# Patient Record
Sex: FEMALE | Race: WHITE
[De-identification: names, ages, dates, MRNs, and addresses within clinical notes are randomized per-mention and may not be internally consistent; named-entity substitution may affect disease eponyms.]

---

## 2019-04-18 ENCOUNTER — HOSPITAL ENCOUNTER (INPATIENT)
Dept: HOSPITAL 74 - JER | Age: 74
LOS: 11 days | Discharge: HOME | DRG: 177 | End: 2019-04-29
Payer: COMMERCIAL

## 2019-04-18 VITALS — BODY MASS INDEX: 20 KG/M2

## 2019-04-18 DIAGNOSIS — L89.154: ICD-10-CM

## 2019-04-18 DIAGNOSIS — I50.9: ICD-10-CM

## 2019-04-18 DIAGNOSIS — R09.02: ICD-10-CM

## 2019-04-18 DIAGNOSIS — G93.41: ICD-10-CM

## 2019-04-18 DIAGNOSIS — G20: ICD-10-CM

## 2019-04-18 DIAGNOSIS — J98.11: ICD-10-CM

## 2019-04-18 DIAGNOSIS — Z74.01: ICD-10-CM

## 2019-04-18 DIAGNOSIS — J69.0: Primary | ICD-10-CM

## 2019-04-18 DIAGNOSIS — R62.7: ICD-10-CM

## 2019-04-18 DIAGNOSIS — N39.0: ICD-10-CM

## 2019-04-18 DIAGNOSIS — R06.02: ICD-10-CM

## 2019-04-18 DIAGNOSIS — Z96.643: ICD-10-CM

## 2019-04-18 DIAGNOSIS — F02.80: ICD-10-CM

## 2019-04-18 LAB
ALBUMIN SERPL-MCNC: 3 G/DL (ref 3.4–5)
ALP SERPL-CCNC: 97 U/L (ref 45–117)
ALT SERPL-CCNC: 6 U/L (ref 13–61)
ANION GAP SERPL CALC-SCNC: 7 MMOL/L (ref 8–16)
APPEARANCE UR: (no result)
APTT BLD: 24.1 SECONDS (ref 25.2–36.5)
AST SERPL-CCNC: 25 U/L (ref 15–37)
BACTERIA #/AREA URNS HPF: 8239.4 /HPF
BASOPHILS # BLD: 0.4 % (ref 0–2)
BILIRUB SERPL-MCNC: 0.4 MG/DL (ref 0.2–1)
BILIRUB UR STRIP.AUTO-MCNC: NEGATIVE MG/DL
BNP SERPL-MCNC: 1036.6 PG/ML (ref 5–450)
BUN SERPL-MCNC: 16 MG/DL (ref 7–18)
CALCIUM SERPL-MCNC: 9 MG/DL (ref 8.5–10.1)
CASTS #/AREA URNS LPF: 64 /LPF (ref 0–8)
CHLORIDE SERPL-SCNC: 103 MMOL/L (ref 98–107)
CO2 SERPL-SCNC: 27 MMOL/L (ref 21–32)
COLOR UR: YELLOW
CREAT SERPL-MCNC: 0.7 MG/DL (ref 0.55–1.3)
DEPRECATED RDW RBC AUTO: 13.5 % (ref 11.6–15.6)
EOSINOPHIL # BLD: 0.4 % (ref 0–4.5)
EPITH CASTS URNS QL MICRO: 2.4 /HPF
GLUCOSE SERPL-MCNC: 94 MG/DL (ref 74–106)
HCT VFR BLD CALC: 35 % (ref 32.4–45.2)
HGB BLD-MCNC: 11.5 GM/DL (ref 10.7–15.3)
INR BLD: 1.28 (ref 0.83–1.09)
KETONES UR QL STRIP: (no result)
LEUKOCYTE ESTERASE UR QL STRIP.AUTO: (no result)
LYMPHOCYTES # BLD: 2.3 % (ref 8–40)
MCH RBC QN AUTO: 30.6 PG (ref 25.7–33.7)
MCHC RBC AUTO-ENTMCNC: 32.8 G/DL (ref 32–36)
MCV RBC: 93.5 FL (ref 80–96)
MONOCYTES # BLD AUTO: 8.7 % (ref 3.8–10.2)
NEUTROPHILS # BLD: 88.2 % (ref 42.8–82.8)
NITRITE UR QL STRIP: POSITIVE
PH UR: 6.5 [PH] (ref 5–8)
PLATELET # BLD AUTO: 371 K/MM3 (ref 134–434)
PMV BLD: 6.7 FL (ref 7.5–11.1)
POTASSIUM SERPLBLD-SCNC: 4.8 MMOL/L (ref 3.5–5.1)
PROT SERPL-MCNC: 7.4 G/DL (ref 6.4–8.2)
PROT UR QL STRIP: (no result)
PROT UR QL STRIP: NEGATIVE
PT PNL PPP: 15.2 SEC (ref 9.7–13)
RBC # BLD AUTO: 3.75 M/MM3 (ref 3.6–5.2)
RBC # BLD AUTO: 8.1 /HPF (ref 0–4)
SODIUM SERPL-SCNC: 137 MMOL/L (ref 136–145)
SP GR UR: 1.02 (ref 1.01–1.03)
UROBILINOGEN UR STRIP-MCNC: 1 MG/DL (ref 0.2–1)
WBC # BLD AUTO: 8.1 K/MM3 (ref 4–10)
WBC # UR AUTO: 428 /HPF (ref 0–5)

## 2019-04-18 PROCEDURE — G0378 HOSPITAL OBSERVATION PER HR: HCPCS

## 2019-04-18 RX ADMIN — QUETIAPINE FUMARATE SCH MG: 25 TABLET ORAL at 23:09

## 2019-04-18 RX ADMIN — METOPROLOL TARTRATE SCH MG: 25 TABLET, FILM COATED ORAL at 23:09

## 2019-04-18 RX ADMIN — HEPARIN SODIUM SCH UNIT: 5000 INJECTION, SOLUTION INTRAVENOUS; SUBCUTANEOUS at 23:09

## 2019-04-18 NOTE — PDOC
History of Present Illness





- General


Stated Complaint: WEEZING


Time Seen by Provider: 04/18/19 14:24


History Source: Patient


Exam Limitations: No Limitations





- History of Present Illness


Initial Comments: 





04/18/19 14:26


74 year old woman history decubitus sacral ulcer, advanced Parkinson's disease (

bedbound) presents with cough and wheeze for 2 days. The patient had a cough 

noted last night by  who called PCP Shaniqua who prescribed Levaquin for 

concern for PNA. Today the patient was having her sacral wound redressed by 

nursing aid who noted that the patient was wheezing and called EMS. 


At bedside patient reports feeling congested and shortness of breath when lying 

back. The patient has not had any fever, denies production of phlegm in cough, 

denies chest pain, denies nausea, vomiting, diarrhea, constipation, dysuria, 

hematuria.





Patient has had wound vac for sacral ulcer for some time and had it removed 

yesterday in wound clinic at this hospital, but the wound vac has not been 

replaced. 





04/18/19 15:11








Past History





- Past Medical History


Allergies/Adverse Reactions: 


 Allergies











Allergy/AdvReac Type Severity Reaction Status Date / Time


 


codeine Allergy Intermediate Itching Verified 04/18/19 14:51


 


Penicillins Allergy Intermediate Rash Verified 04/18/19 14:51











Home Medications: 


Ambulatory Orders





Carbidopa/Levodopa [Rytary ER 36.25 mg-145 mg Cap] 1 each PO QID 05/16/16 


Rotigotine [Neupro] 1 each TD DAILY 05/16/16 


Sennosides/Docusate Sodium [Stool Softener-Laxative Tab] 1 each PO DAILY 05/16/ 16 


Quetiapine Fumarate [Seroquel -] 50 mg PO HS #0 tablet 05/17/16 


Cefuroxime Axetil [Ceftin -] 500 mg PO BID 04/03/19 








Anemia: No


Asthma: No


Cancer: No


Cardiac Disorders: No


CVA: No


COPD: No


CHF: No


Dementia: Yes


Diabetes: No


GI Disorders: No


 Disorders: No


HTN: No


Hypercholesterolemia: No


Liver Disease: No


Seizures: No


Thyroid Disease: No





- Surgical History


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


Appendectomy: No


Cardiac Surgery: No


Cholecystectomy: No


Lung Surgery: No


Neurologic Surgery: No


Orthopedic Surgery: Yes ([facility]ley hip replacement 2003, broken jaw , brown arm)





- Immunization History


Immunization Up to Date: No





- Suicide/Smoking/Psychosocial Hx


Smoking History: Never smoked


Number of Cigarettes Smoked Daily: 0


Hx Alcohol Use: No


Drug/Substance Use Hx: No


Substance Use Type: None





**Review of Systems





- Review of Systems


Able to Perform ROS?: Yes


Comments:: 





04/18/19 14:48


GENERAL/CONSTITUTIONAL: No fever or chills. No weakness.


HEAD, EYES, EARS, NOSE AND THROAT: No change in vision. No ear pain or 

discharge. No sore throat.


CARDIOVASCULAR: No chest pain or shortness of breath


RESPIRATORY: No hemoptysis. + cough, wheeze


GASTROINTESTINAL: No nausea, vomiting, diarrhea or constipation.


GENITOURINARY: No dysuria, frequency, or change in urination.


MUSCULOSKELETAL: No joint or muscle swelling or pain. No neck or back pain.


SKIN: No rash


NEUROLOGIC: No headache, vertigo, loss of consciousness, or change in strength/

sensation.


ENDOCRINE: No increased thirst. No abnormal weight change


HEMATOLOGIC/LYMPHATIC: No anemia, easy bleeding, or history of blood clots.


ALLERGIC/IMMUNOLOGIC: No hives or skin allergy.








Is the patient limited English proficient: No





*Physical Exam





- Physical Exam


Comments: 





04/18/19 15:32








CTAB


stage 4 sacral wound 





ED Treatment Course





- LABORATORY


CBC & Chemistry Diagram: 


 04/18/19 15:27





 04/18/19 15:27





Medical Decision Making





- Medical Decision Making





04/18/19 14:45


74 year old woman history decubitus sacral ulcer, advanced Parkinson's disease 

presents with cough and wheeze for 2 days. The patient had a cough noted last 

night by  who called PCP Shaniqua who prescribed Levaquin for concern for 

PNA. Today the patient was having her sacral wound redressed by nursing aid who 

noted that the patient was wheezing and called EMS. 





ED Course:


consider pna vs chf vs 


r./o acs vs arrythmia 


r/o PE





04/18/19 15:07


At bedside patient bp 124/66, satting ~95 on RA





04/18/19 17:19


labs within normal 


CXR: cardiomegaly, coarse central changes, nodular density in the L apex, 

atelectasis at the L base 


Patient down to 92 O2


4L NC placed 





04/18/19 18:08


UA w/ uti 


dose bactrim 


pending CTA to r/o PE








*DC/Admit/Observation/Transfer





- Referrals


Referrals: 


Joel Henry MD [Primary Care Provider] - 





- Patient Instructions





- Post Discharge Activity

## 2019-04-18 NOTE — PDOC
*Physical Exam





- Vital Signs


 Last Vital Signs











Temp Pulse Resp BP Pulse Ox


 


 99.3 F   63   18   120/64   98 


 


 19 14:05  19 17:33  19 17:33  19 17:33  19 17:33














- Physical Exam


Comments: 





19 19:51


GENERAL: Awake, alert, and fully oriented, in no acute distress


HEAD: No signs of trauma, normocephalic, atraumatic 


EYES: PERRLA, EOMI, sclera anicteric, conjunctiva clear


ENT: Auricles normal inspection, hearing grossly normal, nares patent, 

oropharynx clear without


exudates. Moist mucosa


NECK: Normal ROM, supple, no lymphadenopathy, JVD, or masses


LUNGS:Coarse lung sounds throughout. Diffuse rales and Diffuse exp rhonci. 

speaks full sentences. 


HEART: Regular rate and rhythm, normal S1 and S2, no murmurs, rubs or gallops, 

peripheral pulses normal and equal bilaterally. 


ABDOMEN: Soft, nontender, normoactive bowel sounds.  No guarding, no rebound.  

No masses


BACK: + Sacral ulcer stage 4 


EXTREMITIES : Normal inspection, Normal range of motion, no edema.  No clubbing 

or cyanosis. 


NEUROLOGICAL: Cranial nerves II through XII grossly intact.  Normal speech, 

normal gait, no focal sensorimotor deficits 


SKIN: Warm, Dry, normal turgor, no rashes or lesions noted








ED Treatment Course





- LABORATORY


CBC & Chemistry Diagram: 


 19 15:27





 19 15:27





- ADDITIONAL ORDERS


Additional order review: 


 Laboratory  Results











  19





  16:10 16:10 15:27


 


PT with INR   


 


INR   


 


PTT (Actin FS)   


 


D-Dimer  2180 H  


 


VBG pH   


 


POC VBG pCO2   


 


POC VBG pO2   


 


VBG HCO3   


 


VBG O2 Sat ([place])   


 


VBG Base Excess   


 


Sodium    137


 


Potassium    4.8


 


Chloride    103


 


Carbon Dioxide    27


 


Anion Gap    7 L


 


BUN    16


 


Creatinine    0.7


 


Creat Clearance w eGFR    81.36


 


Random Glucose    94


 


Lactic Acid   


 


Calcium    9.0


 


Total Bilirubin    0.4


 


AST    25


 


ALT    6 L


 


Alkaline Phosphatase    97


 


Troponin I    < 0.02


 


B-Natriuretic Peptide    1036.6 H


 


Total Protein    7.4


 


Albumin    3.0 L


 


Urine Color   Yellow 


 


Urine Appearance   Turbid 


 


Urine pH   6.5 


 


Ur Specific Gravity   1.020 


 


Urine Protein   Trace 


 


Urine Glucose (UA)   Negative 


 


Urine Ketones   Trace H 


 


Urine Blood   Negative 


 


Urine Nitrite   Positive H 


 


Urine Bilirubin   Negative 


 


Urine Urobilinogen   1.0 


 


Ur Leukocyte Esterase   3+ H 


 


Urine WBC (Auto)   428 


 


Urine RBC (Auto)   8.1 


 


Urine Casts (Auto)   64 


 


U Epithel Cells (Auto)   2.4 


 


Urine Bacteria (Auto)   8239.4 














  19





  15:20 15:20 15:20


 


PT with INR    15.20 H


 


INR    1.28 H


 


PTT (Actin FS)    24.1 L


 


D-Dimer   


 


VBG pH   Cancelled 


 


POC VBG pCO2   Cancelled 


 


POC VBG pO2   Cancelled 


 


VBG HCO3   Cancelled 


 


VBG O2 Sat ([place])   Cancelled 


 


VBG Base Excess   Cancelled 


 


Sodium   


 


Potassium   


 


Chloride   


 


Carbon Dioxide   


 


Anion Gap   


 


BUN   


 


Creatinine   


 


Creat Clearance w eGFR   


 


Random Glucose   


 


Lactic Acid  1.1  


 


Calcium   


 


Total Bilirubin   


 


AST   


 


ALT   


 


Alkaline Phosphatase   


 


Troponin I   


 


B-Natriuretic Peptide   


 


Total Protein   


 


Albumin   


 


Urine Color   


 


Urine Appearance   


 


Urine pH   


 


Ur Specific Gravity   


 


Urine Protein   


 


Urine Glucose (UA)   


 


Urine Ketones   


 


Urine Blood   


 


Urine Nitrite   


 


Urine Bilirubin   


 


Urine Urobilinogen   


 


Ur Leukocyte Esterase   


 


Urine WBC (Auto)   


 


Urine RBC (Auto)   


 


Urine Casts (Auto)   


 


U Epithel Cells (Auto)   


 


Urine Bacteria (Auto)   








 











  19





  15:27


 


RBC  3.75


 


MCV  93.5


 


MCHC  32.8


 


RDW  13.5


 


MPV  6.7 L


 


Neutrophils %  88.2 H


 


Lymphocytes %  2.3 L D


 


Monocytes %  8.7


 


Eosinophils %  0.4


 


Basophils %  0.4














- RADIOLOGY


Radiology Studies Ordered: 





19 20:07


[facility] Name: [facility] DEPARTMENT OF RADIOLOGY Phys: Kareem Rose MD 

: 1945 Age: 74 Sex: F [facility] Acct: [id number] 

Loc: [address] Exam Date: 19 Status: [facility] [place] [address] 

Unit Number: [id number] ACCESSION #: [id number] EXAM#: TYPE/EXAM

: RESULT: [id number] CT/CHEST CTA Chest CT angiography Clinical information given

: evaluate for pulmonary embolism Multiplanar imaging was performed following 

the intravenous bolus administration of nonionic contrast. No prior CT studies 

are available at this facility for direct comparison. No definite pulmonary 

embolus is noted. Evaluation of the bilateral lower lobe subsegmental vessels 

is limited due to respiratory motion artifact. An approximately 10 x 2.3 x 0.5 

cm mildly hyperdense focus is seen abutting the left posterolateral border of 

the descending aorta suggestive of an intramural hematoma. No intimal flap or 

aortic lumen compromise is seen. No aortic aneurysm is seen There is no 

definite cardiac enlargement. A small pericardial effusion is noted. No 

evidence of pneumothorax, infiltrate or pleural effusion. There is mild 

bilateral lower lobe discoid atelectasis with mild subpleural atelectasis 

posteriorly. The visualized osseous structures demonstrate no gross acute 

pathology. No obvious endobronchial pathology is visualized. Impression: No 

definite CT evidence of pulmonary embolism. Evaluation of the lower lobe 

subsegmental vessels bilaterally is limited due to motion artifact. If 

clinically indicated correlate with bilateral lower extremity venous 

sonography. An approximately 10 x 2.3 x 0.5 cm mildly hyperdense crescentic 

focus is seen abutting the left posterior lateral border of the descending 

aorta suggestive of an intramural hematoma. Mild bilateral lower lobe discoid 

atelectasis. Mild bibasilar subpleural atelectasis posteriorly. Reported By: 

Randall De Dios MD 19 Kareem Rose Technologist: Millicent Caldwell 

Transcribed Date/Time: 19 : Randall De Dios Printed 

Date/Time: By: 





- Medications


Given in the ED: 


ED Medications














Discontinued Medications














Generic Name Dose Route Start Last Admin





  Trade Name Freq  PRN Reason Stop Dose Admin


 


Albuterol/Ipratropium  1 amp  19 15:29  19 15:35





  Duoneb -  NEB  19 15:30  1 amp





  ONCE ONE   Administration





     





     





     





     














Medical Decision Making





- Medical Decision Making





19 19:44


75 yo F with h/o decubitus sacral ulcer, Wound vac for sacral ulcer for 

extended period of time and removed 19 in wound clinic at [facility], advanced 

Parkinson's disease (bedbound BIBEMS with cough and wheeze x 2 days. Received 

signout from Dr. Cabello. Patient on Levaquin for presumed PNA x 2 days. Noted 

to be hypoxic by EMS to 80% O2 RA. BP 94/53. Patient reports orthopnea. 


Physical exam coarse lung sounds throughout. Diffuse rales and Diffuse exp 

rhonci, received duonebs. ED course also notable for 92 % O2 4 L NC, vitals wnl

, AF, CXR with cardiomegaly and coarse central changes with nodular density in 

L apex. Pending CTA r/o PE. Patient with elevated BNP, hypoxic, SOB. Plan to 

admit CHF exacerbation. 





19 20:05


ED Course: 


 Laboratory Tests











  19





  15:20 15:27 15:27


 


WBC   8.1 


 


Hgb   11.5 


 


Hct   35.0 


 


Plt Count   371 


 


BUN    16


 


Creatinine    0.7


 


Lactic Acid  1.1  


 


Troponin I    < 0.02


 


B-Natriuretic Peptide    1036.6 H


 


Urine Color   


 


Urine Ketones   


 


Urine Nitrite   


 


Ur Leukocyte Esterase   


 


Urine WBC (Auto)   


 


Urine RBC (Auto)   


 


Urine Bacteria (Auto)   


 


Influenza A (Rapid)   


 


Influenza B (Rapid)   














  19





  16:10 16:10


 


WBC  


 


Hgb  


 


Hct  


 


Plt Count  


 


BUN  


 


Creatinine  


 


Lactic Acid  


 


Troponin I  


 


B-Natriuretic Peptide  


 


Urine Color  Yellow 


 


Urine Ketones  Trace H 


 


Urine Nitrite  Positive H 


 


Ur Leukocyte Esterase  3+ H 


 


Urine WBC (Auto)  428 


 


Urine RBC (Auto)  8.1 


 


Urine Bacteria (Auto)  8239.4 


 


Influenza A (Rapid)   Negative


 


Influenza B (Rapid)   Negative











19 20:08


CTA chest: Impression: No definite CT evidence of pulmonary embolism. 

Evaluation of the lower lobe subsegmental vessels bilaterally is limited due to 

motion artifact. If clinically indicated correlate with bilateral lower 

extremity venous sonography. An approximately 10 x 2.3 x 0.5 cm mildly 

hyperdense crescentic focus is seen abutting the left posterior lateral border 

of the descending aorta suggestive of an intramural hematoma. Mild bilateral 

lower lobe discoid atelectasis. Mild bibasilar subpleural atelectasis 

posteriorly


19 20:08





Patient refused bactrim


19 20:31


Patient endorsed to Dr. Kalee Boggs Methylpred 


Admit tele/obs








*DC/Admit/Observation/Transfer


Diagnosis at time of Disposition: 


 Hypoxia, SOB (shortness of breath)





CHF exacerbation


Qualifiers:


 Heart failure type: unspecified Qualified Code(s): I50.9 - Heart failure, 

unspecified








- Discharge Dispostion


Condition at time of disposition: Stable


Decision to Admit order: Yes





- Referrals





- Patient Instructions





- Post Discharge Activity

## 2019-04-18 NOTE — HP
CHIEF COMPLAINT:  shortness of breath 





PCP: Fortunato  





HISTORY OF PRESENT ILLNESS:


74 year old woman history decubitus sacral ulcer, advanced Parkinson's disease (

bedbound) presents with cough and wheeze for 2 days. The patient had a cough 

noted last night by  who called PCP Fortunato who prescribed Levaquin for 

concern for PNA, found to have wheezing by visiting nurse, sent in to hospital 

for evaluation. 








ER course was notable for:


(1) Chest CT 


(2) duonebs 


(3) prednisone 


 


Recent Travel: no 





PAST MEDICAL HISTORY: decubitus sacral ulcer, advanced Parkinson's disease (

bedbound)





PAST SURGICAL HISTORY: no 





Social History:


Smoking: former smoker 


Alcohol: no 


Drugs:  no 





Family History:


Allergies





codeine Allergy (Intermediate, Verified 04/18/19 14:51)


 Itching


Penicillins Allergy (Intermediate, Verified 04/18/19 14:51)


 Rash








HOME MEDICATIONS:


 Home Medications











 Medication  Instructions  Recorded


 


Carbidopa/Levodopa [Rytary ER 1 each PO QID 05/16/16





36.25 mg-145 mg Cap]  


 


Rotigotine [Neupro] 1 each TD DAILY 05/16/16


 


Sennosides/Docusate Sodium [Stool 1 each PO DAILY 05/16/16





Softener-Laxative Tab]  


 


Cefuroxime Axetil [Ceftin -] 500 mg PO BID 04/03/19


 


Quetiapine Fumarate [Seroquel -] 25 mg PO TID 04/18/19








REVIEW OF SYSTEMS


CONSTITUTIONAL: 


Absent:  fever, chills, diaphoresis, generalized weakness, malaise, loss of 

appetite, weight change


HEENT: 


Absent:  rhinorrhea, nasal congestion, throat pain, throat swelling, difficulty 

swallowing, mouth swelling, ear pain, eye pain, visual changes


CARDIOVASCULAR: 


Absent: chest pain, syncope, palpitations, irregular heart rate, lightheadedness

, peripheral edema


RESPIRATORY: 


Absent: cough, dyspnea with exertion, orthopnea, wheezing, stridor, hemoptysis


present -  shortness of breath,


GASTROINTESTINAL:


Absent: abdominal pain, abdominal distension, nausea, vomiting, diarrhea, 

constipation, melena, hematochezia


GENITOURINARY: 


Absent: dysuria, frequency, urgency, hesitancy, hematuria, flank pain, genital 

pain


MUSCULOSKELETAL: 


Absent: myalgia, arthralgia, joint swelling, back pain, neck pain


SKIN: 


Absent: rash, itching, pallor


HEMATOLOGIC/IMMUNOLOGIC: 


Absent: easy bleeding, easy bruising, lymphadenopathy, frequent infections


ENDOCRINE:


Absent: unexplained weight gain, unexplained weight loss, heat intolerance, 

cold intolerance


NEUROLOGIC: 


Absent: headache, focal weakness or paresthesias, dizziness, unsteady gait, 

seizure, mental status changes, bladder or bowel incontinence


PSYCHIATRIC: 


Absent: anxiety, depression, suicidal or homicidal ideation, hallucinations.








PHYSICAL EXAMINATION


 Vital Signs - 24 hr











  04/18/19 04/18/19 04/18/19





  14:05 15:06 15:31


 


Temperature 99.3 F  


 


Pulse Rate 90  


 


Pulse Rate [  87 76





Left Radial]   


 


Respiratory 20 20 20





Rate   


 


Blood Pressure 94/53 L  


 


Blood Pressure  124/66 108/69





[Right Arm]   


 


O2 Sat by Pulse 89 L 98 98





Oximetry (%)   














  04/18/19 04/18/19





  16:41 17:33


 


Temperature  


 


Pulse Rate  


 


Pulse Rate [ 63 63





Left Radial]  


 


Respiratory 20 18





Rate  


 


Blood Pressure  


 


Blood Pressure 121/61 120/64





[Right Arm]  


 


O2 Sat by Pulse 98 98





Oximetry (%)  











GENERAL: Awake, alert, disoriented 


HEAD: Normal with no signs of trauma.


EYES: Pupils equal, round and reactive to light, extraocular movements intact, 

sclera anicteric, conjunctiva clear. No lid lag.


EARS, NOSE, THROAT: Ears normal, nares patent, oropharynx clear without 

exudates. Moist mucous membranes.


NECK: Normal range of motion, supple without lymphadenopathy, JVD, or masses.


LUNGS: some coarse breaths sounds b/l, no wheezing appreciated 


HEART: Regular rate and rhythm, normal S1 and S2 without murmur, rub or gallop.


ABDOMEN: Soft, nontender, not distended, normoactive bowel sounds, no guarding, 

no rebound, no masses.  No hepatomegaly or  splenomegaly. 


MUSCULOSKELETAL: Normal range of motion at all joints. No bony deformities or 

tenderness. No CVA tenderness.


UPPER EXTREMITIES: 2+ pulses, warm, well-perfused. No cyanosis. No clubbing. No 

peripheral edema.


LOWER EXTREMITIES: 2+ pulses, warm, well-perfused. No calf tenderness. No 

peripheral edema. 


NEUROLOGICAL:  Cranial nerves II-XII intact. dementia, bed bound 


PSYCHIATRIC: dementia 


SKIN: Warm, dry, normal turgor, no rashes


stage 4 sacral decubitus ulcer  








 Laboratory Results - last 24 hr











  04/18/19 04/18/19 04/18/19





  15:20 15:20 15:20


 


WBC   


 


RBC   


 


Hgb   


 


Hct   


 


MCV   


 


MCH   


 


MCHC   


 


RDW   


 


Plt Count   


 


MPV   


 


Absolute Neuts (auto)   


 


Neutrophils %   


 


Lymphocytes %   


 


Monocytes %   


 


Eosinophils %   


 


Basophils %   


 


Nucleated RBC %   


 


PT with INR  15.20 H  


 


INR  1.28 H  


 


PTT (Actin FS)  24.1 L  


 


D-Dimer   


 


VBG pH   Cancelled 


 


POC VBG pCO2   Cancelled 


 


POC VBG pO2   Cancelled 


 


VBG HCO3   Cancelled 


 


VBG O2 Sat ([place])   Cancelled 


 


VBG Base Excess   Cancelled 


 


Sodium   


 


Potassium   


 


Chloride   


 


Carbon Dioxide   


 


Anion Gap   


 


BUN   


 


Creatinine   


 


Creat Clearance w eGFR   


 


Random Glucose   


 


Lactic Acid    1.1


 


Calcium   


 


Total Bilirubin   


 


AST   


 


ALT   


 


Alkaline Phosphatase   


 


Troponin I   


 


B-Natriuretic Peptide   


 


Total Protein   


 


Albumin   


 


Urine Color   


 


Urine Appearance   


 


Urine pH   


 


Ur Specific Gravity   


 


Urine Protein   


 


Urine Glucose (UA)   


 


Urine Ketones   


 


Urine Blood   


 


Urine Nitrite   


 


Urine Bilirubin   


 


Urine Urobilinogen   


 


Ur Leukocyte Esterase   


 


Urine WBC (Auto)   


 


Urine RBC (Auto)   


 


Urine Casts (Auto)   


 


U Epithel Cells (Auto)   


 


Urine Bacteria (Auto)   


 


Influenza A (Rapid)   


 


Influenza B (Rapid)   














  04/18/19 04/18/19 04/18/19





  15:27 15:27 16:10


 


WBC  8.1  


 


RBC  3.75  


 


Hgb  11.5  


 


Hct  35.0  


 


MCV  93.5  


 


MCH  30.6  


 


MCHC  32.8  


 


RDW  13.5  


 


Plt Count  371  


 


MPV  6.7 L  


 


Absolute Neuts (auto)  7.1  


 


Neutrophils %  88.2 H  


 


Lymphocytes %  2.3 L D  


 


Monocytes %  8.7  


 


Eosinophils %  0.4  


 


Basophils %  0.4  


 


Nucleated RBC %  0  


 


PT with INR   


 


INR   


 


PTT (Actin FS)   


 


D-Dimer   


 


VBG pH   


 


POC VBG pCO2   


 


POC VBG pO2   


 


VBG HCO3   


 


VBG O2 Sat ([place])   


 


VBG Base Excess   


 


Sodium   137 


 


Potassium   4.8 


 


Chloride   103 


 


Carbon Dioxide   27 


 


Anion Gap   7 L 


 


BUN   16 


 


Creatinine   0.7 


 


Creat Clearance w eGFR   81.36 


 


Random Glucose   94 


 


Lactic Acid   


 


Calcium   9.0 


 


Total Bilirubin   0.4 


 


AST   25 


 


ALT   6 L 


 


Alkaline Phosphatase   97 


 


Troponin I   < 0.02 


 


B-Natriuretic Peptide   1036.6 H 


 


Total Protein   7.4 


 


Albumin   3.0 L 


 


Urine Color    Yellow


 


Urine Appearance    Turbid


 


Urine pH    6.5


 


Ur Specific Gravity    1.020


 


Urine Protein    Trace


 


Urine Glucose (UA)    Negative


 


Urine Ketones    Trace H


 


Urine Blood    Negative


 


Urine Nitrite    Positive H


 


Urine Bilirubin    Negative


 


Urine Urobilinogen    1.0


 


Ur Leukocyte Esterase    3+ H


 


Urine WBC (Auto)    428


 


Urine RBC (Auto)    8.1


 


Urine Casts (Auto)    64


 


U Epithel Cells (Auto)    2.4


 


Urine Bacteria (Auto)    8239.4


 


Influenza A (Rapid)   


 


Influenza B (Rapid)   














  04/18/19 04/18/19





  16:10 16:10


 


WBC  


 


RBC  


 


Hgb  


 


Hct  


 


MCV  


 


MCH  


 


MCHC  


 


RDW  


 


Plt Count  


 


MPV  


 


Absolute Neuts (auto)  


 


Neutrophils %  


 


Lymphocytes %  


 


Monocytes %  


 


Eosinophils %  


 


Basophils %  


 


Nucleated RBC %  


 


PT with INR  


 


INR  


 


PTT (Actin FS)  


 


D-Dimer  2180 H 


 


VBG pH  


 


POC VBG pCO2  


 


POC VBG pO2  


 


VBG HCO3  


 


VBG O2 Sat ([place])  


 


VBG Base Excess  


 


Sodium  


 


Potassium  


 


Chloride  


 


Carbon Dioxide  


 


Anion Gap  


 


BUN  


 


Creatinine  


 


Creat Clearance w eGFR  


 


Random Glucose  


 


Lactic Acid  


 


Calcium  


 


Total Bilirubin  


 


AST  


 


ALT  


 


Alkaline Phosphatase  


 


Troponin I  


 


B-Natriuretic Peptide  


 


Total Protein  


 


Albumin  


 


Urine Color  


 


Urine Appearance  


 


Urine pH  


 


Ur Specific Gravity  


 


Urine Protein  


 


Urine Glucose (UA)  


 


Urine Ketones  


 


Urine Blood  


 


Urine Nitrite  


 


Urine Bilirubin  


 


Urine Urobilinogen  


 


Ur Leukocyte Esterase  


 


Urine WBC (Auto)  


 


Urine RBC (Auto)  


 


Urine Casts (Auto)  


 


U Epithel Cells (Auto)  


 


Urine Bacteria (Auto)  


 


Influenza A (Rapid)   Negative


 


Influenza B (Rapid)   Negative





imaging studies reviewed 





ASSESSMENT/PLAN:


#Shortness of breath - may be secondary to CHF exacerbation vs reactive airway 

disease. No lung infiltrates on chest CT,  no evidence of PE. Pulmonary 

vasculature appears congested, elevated BNP  c/w CHF exacerbation.  


-tele-obs 


-lasix 40mg IV daily 


-i/o 


-daily weights 


-fluid restriction 


-bblocker 


-acei 


-echo 


-cardiology evaluation 





#Parkinson disease


-c/w home dose carbidopa/levadopa 


-bed rest 





#Sacral decubitus ulcer 


-local wound care 


-consult - dr hair





#DVT ppx 


-heparin sc 











Visit type





- Emergency Visit


Emergency Visit: Yes


ED Registration Date: 04/18/19


Care time: The patient presented to the Emergency Department on the above date 

and was hospitalized for further evaluation of their emergent condition.





- New Patient


This patient is new to me today: Yes


Date on this admission: 04/18/19





- Critical Care


Critical Care patient: No

## 2019-04-18 NOTE — PDOC
Documentation entered by Tiara Handley SCRIBE, acting as scribe for Kareem Rose MD.








Kareem Rose MD:  This documentation has been prepared by the Emmanuelle wang Amanda, SCRIBE, under my direction and personally reviewed by me in its 

entirety.  I confirm that the documentation accurately reflects all work, 

treatment, procedures, and medical decision making performed by me.  





Attending Attestation





- Resident


Resident Name: Elisa Cabello





- ED Attending Attestation


I have performed the following: I have examined & evaluated the patient, The 

case was reviewed & discussed with the resident, I agree w/resident's findings 

& plan, Exceptions are as noted





- HPI


HPI: 





04/18/19 15:29


The patient is a 74 year old female with a significant medical history of 

decubitus sacral ulcer, advanced Parkinson's disease (bedbound) who presents to 

the ED with cough and wheezing for 2 days. The patient states she had a cough 

last night, called Dr. Henry who prescribed Levaquin for concern of possible 

pneumonia. The patient reports shortness of breath when lying flat. She states 

her cough is nonproductive. 





The patient denies chest pain, headache and dizziness. The patient denies fever

, chills, nausea, vomit, diarrhea and constipation. The patient denies dysuria, 

frequency, urgency and hematuria. 





- Physicial Exam


PE: 


04/18/19 15:31


GENERAL: Awake, alert, and fully oriented, in no acute distress


HEAD: No signs of trauma


EYES: PERRLA, EOMI, sclera anicteric, conjunctiva clear


ENT: Auricles normal inspection, hearing grossly normal, nares patent, 

oropharynx clear without exudates. Moist mucosa


NECK: Normal ROM, supple, no lymphadenopathy, JVD, or masses


LUNGS: Breath sounds equal, clear to auscultation bilaterally.  No wheezes, and 

no crackles


HEART: Regular rate and rhythm, normal S1 and S2, no murmurs, rubs or gallops


ABDOMEN: Soft, nontender, normoactive bowel sounds.  No guarding, no rebound.  

No masses


EXTREMITIES: Normal range of motion, no edema.  No clubbing or cyanosis. No 

cords, erythema, or tenderness


NEUROLOGICAL: Cranial nerves II-XII intact. Normal speech, normal gait. 

Sensation intact in upper and lower extremities. 5/5 motor strength in upper 

and lower extremities. No pronator drift. Finger to nose intact. Rapid 

alternations intact. 


SKIN: (+) stage 4 sacral wound c/d/i. Warm, Dry, normal turgor, no rashes or 

lesions noted.








- Medical Decision Making





04/18/19 15:39





Vital Signs











Temp Pulse Resp BP Pulse Ox


 


 99.3 F   90   20   94/53 L  89 L


 


 04/18/19 14:05  04/18/19 14:05  04/18/19 14:05  04/18/19 14:05  04/18/19 14:05








A portion of this note was documented by scribe services under my direction. I 

have reviewed the details of the note, within reason, and agree with the 

documentation with the following case summary and management plan written by 

me. 





Patient treated in the ED.





Nursing notes are reviewed and incorporated into the medical decision-making.


Vital signs reviewed.





Peripheral IV access obtained by the nurse, laboratory studies are drawn and 

sent, reviewed and interpreted by myself. 





74 year old female with Past medical history of bilateral hip replacement, 

Parkinson's disease and dementia, bedbound presents with cough and wheezing and 

difficulty breathing since yesterday. Denies sick contacts or recent travels. 

Denies fevers but noted that she was having chest congestion and wheezing. The 

patient  had called the primary care physician, Dr. Henry went 

prescribed levofloxacin which the patient has been taking for 2 days. However, 

the patient's symptoms were worsening. EMS was activated and the patient's 

oxygenation saturation was 89%. Over here, in the emergency department, the O2 

saturation is 92%. I am concerned for pneumonia. We'll child 2 nabs. Rule out 

influenza. Chest x-ray, sepsis workup. The patient retook her dose of Levaquin 

today. We'll need to admit the patient to the hospital.


04/18/19 15:59





 CBC, BMP





 04/18/19 15:27 





 CMP











Lactic Acid  1.1 mmol/L (0.4-2.0)   04/18/19  15:20    








Given pt is bedbound and with normal WBC, will await for chest xray.


If chest xray is unremarkable, given that patient is bedbound, we should 

evaluate with CTA chest to r/o PE.


Either way, pt should be admitted to the hospital.


04/18/19 17:11





 CMP











Sodium  137 mmol/L (136-145)   04/18/19  15:27    


 


Potassium  4.8 mmol/L (3.5-5.1)   04/18/19  15:27    


 


Chloride  103 mmol/L ()   04/18/19  15:27    


 


Carbon Dioxide  27 mmol/L (21-32)   04/18/19  15:27    


 


Anion Gap  7 MMOL/L (8-16)  L  04/18/19  15:27    


 


BUN  16 mg/dL (7-18)   04/18/19  15:27    


 


Creatinine  0.7 mg/dL (0.55-1.3)   04/18/19  15:27    


 


Creat Clearance w eGFR  81.36  (>60)   04/18/19  15:27    


 


Random Glucose  94 mg/dL ()   04/18/19  15:27    


 


Lactic Acid  1.1 mmol/L (0.4-2.0)   04/18/19  15:20    


 


Calcium  9.0 mg/dL (8.5-10.1)   04/18/19  15:27    


 


Total Bilirubin  0.4 mg/dL (0.2-1)   04/18/19  15:27    


 


AST  25 U/L (15-37)   04/18/19  15:27    


 


ALT  6 U/L (13-61)  L  04/18/19  15:27    


 


Alkaline Phosphatase  97 U/L ()   04/18/19  15:27    


 


Troponin I  < 0.02 ng/ml (0.00-0.05)   04/18/19  15:27    


 


B-Natriuretic Peptide  1036.6 pg/ml (5-450)  H  04/18/19  15:27    


 


Total Protein  7.4 g/dl (6.4-8.2)   04/18/19  15:27    


 


Albumin  3.0 g/dl (3.4-5.0)  L  04/18/19  15:27    








BNP elevated.


Chest xray reviewed by me, pending official radiology read. Cardiomegaly. ?Pulm 

vas congestion


Will obtain CTA to r/o PE, but pt should be admitted for CHF workup.


Will still need to consider infectious.





04/18/19 17:12


Pt signed out to oncoming ED attending Dr. Delgadillo for further management and 

disposition.





**Heart Score/ECG Review


  ** #1


ECG reviewed & interpreted by me at: 14:40





04/18/19 16:03


NSR 88, no std/summer, normal axis, normal intervals,  msec

## 2019-04-19 LAB
ANION GAP SERPL CALC-SCNC: 9 MMOL/L (ref 8–16)
BASOPHILS # BLD: 0.3 % (ref 0–2)
BUN SERPL-MCNC: 14 MG/DL (ref 7–18)
CALCIUM SERPL-MCNC: 9 MG/DL (ref 8.5–10.1)
CHLORIDE SERPL-SCNC: 102 MMOL/L (ref 98–107)
CO2 SERPL-SCNC: 27 MMOL/L (ref 21–32)
CREAT SERPL-MCNC: 0.6 MG/DL (ref 0.55–1.3)
DEPRECATED RDW RBC AUTO: 13.6 % (ref 11.6–15.6)
EOSINOPHIL # BLD: 0 % (ref 0–4.5)
GLUCOSE SERPL-MCNC: 133 MG/DL (ref 74–106)
HCT VFR BLD CALC: 34.3 % (ref 32.4–45.2)
HGB BLD-MCNC: 11.6 GM/DL (ref 10.7–15.3)
LYMPHOCYTES # BLD: 9.2 % (ref 8–40)
MCH RBC QN AUTO: 30.7 PG (ref 25.7–33.7)
MCHC RBC AUTO-ENTMCNC: 33.8 G/DL (ref 32–36)
MCV RBC: 90.9 FL (ref 80–96)
MONOCYTES # BLD AUTO: 3.3 % (ref 3.8–10.2)
NEUTROPHILS # BLD: 87.2 % (ref 42.8–82.8)
PLATELET # BLD AUTO: 352 K/MM3 (ref 134–434)
PMV BLD: 6.6 FL (ref 7.5–11.1)
POTASSIUM SERPLBLD-SCNC: 4.1 MMOL/L (ref 3.5–5.1)
RBC # BLD AUTO: 3.78 M/MM3 (ref 3.6–5.2)
SODIUM SERPL-SCNC: 139 MMOL/L (ref 136–145)
WBC # BLD AUTO: 2.7 K/MM3 (ref 4–10)

## 2019-04-19 RX ADMIN — Medication SCH EACH: at 21:56

## 2019-04-19 RX ADMIN — HEPARIN SODIUM SCH UNIT: 5000 INJECTION, SOLUTION INTRAVENOUS; SUBCUTANEOUS at 10:40

## 2019-04-19 RX ADMIN — Medication SCH EACH: at 18:03

## 2019-04-19 RX ADMIN — QUETIAPINE FUMARATE SCH: 25 TABLET ORAL at 06:39

## 2019-04-19 RX ADMIN — LISINOPRIL SCH MG: 5 TABLET ORAL at 10:39

## 2019-04-19 RX ADMIN — Medication SCH EACH: at 14:59

## 2019-04-19 RX ADMIN — HEPARIN SODIUM SCH: 5000 INJECTION, SOLUTION INTRAVENOUS; SUBCUTANEOUS at 22:01

## 2019-04-19 RX ADMIN — DOCUSATE SODIUM,SENNOSIDES SCH TABLET: 50; 8.6 TABLET, FILM COATED ORAL at 10:39

## 2019-04-19 RX ADMIN — METOPROLOL TARTRATE SCH MG: 25 TABLET, FILM COATED ORAL at 10:39

## 2019-04-19 RX ADMIN — QUETIAPINE FUMARATE SCH MG: 25 TABLET ORAL at 15:05

## 2019-04-19 RX ADMIN — Medication SCH EACH: at 12:27

## 2019-04-19 RX ADMIN — QUETIAPINE FUMARATE SCH MG: 25 TABLET ORAL at 21:56

## 2019-04-19 NOTE — ECHO
______________________________________________________________________________



Name: SALVADOR RUSS                                   Exam:Adult Echocardiogram

MRN: [id number]         Study Date: 2019 10:23 AM

Age: 74 yrs

______________________________________________________________________________



Reason For Study: EVALUATE FOR SYSTOLIC DYSFUNCTION

Height: 66 in        Weight: 140 lb        BSA: 1.7 m2



______________________________________________________________________________



MMode/2D Measurements & Calculations

IVSd: 0.90 cm                                         Ao root diam: 3.7 cm

LVIDd: 4.5 cm                                         LA dimension: 3.5 cm

LVIDs: 2.8 cm

LVPWd: 0.75 cm



_______________________________________________________

EDV(Teich): 93.0 ml                                   LVOT diam: 2.3 cm

ESV(Teich): 30.3 ml



Doppler Measurements & Calculations

MV E max nicholas: 39.7 cm/sec                                 Ao V2 max: 103.8 cm/sec

MV A max nicholas: 59.8 cm/sec                                 Ao max P.3 mmHg

MV E/A: 0.66                                              Ao V2 mean: 67.3 cm/sec

                                                          Ao mean P.0 mmHg

                                                          Ao V2 VTI: 22.7 cm



                                                          RAISA(I,D): 3.8 cm2

                                                          AI P1/2t: 486.3 msec

                                                          RAISA(V,D): 3.3 cm2



___________________________________________________________

AI max nicholas: 181.6 cm/sec                                  LV V1 max P.6 mmHg

AI max P.2 mmHg                                      LV V1 mean P.6 mmHg

AI dec slope: 109.4 cm/sec2                               LV V1 max: 80.2 cm/sec

                                                          LV V1 mean: 60.9 cm/sec

                                                          LV V1 VTI: 20.4 cm

___________________________________________________________



SV(LVOT): 86.3 ml                                         TR max nicholas: 189.5 cm/sec

                                                          TR max P.4 mmHg

___________________________________________________________



Med Peak E' Nicholas: 3.3 cm/sec

Med E/e': 12.1

Lat Peak E' Nicholas: 5.8 cm/sec

Lat E/e': 6.8



______________________________________________________________________________



Left Ventricle

Left ventricular systolic function is grossly normal. Ejection Fraction = 50-55%.

Right Ventricle

The right ventricle is grossly normal size. The right ventricular systolic function is grossly normal
.

Atria

Normal left and right atrial size and function.

Mitral Valve

The mitral valve is normal in structure and function. There is no mitral valve stenosis. There is mil
d mitral

regurgitation.

Tricuspid Valve

The tricuspid valve is normal in structure and function. There is mild tricuspid regurgitation.

Aortic Valve

The aortic valve opens well. No hemodynamically significant valvular aortic stenosis. Mild aortic

regurgitation.

Pulmonic Valve

The pulmonic valve is not well seen, but is grossly normal. There is no pulmonic valvular stenosis. T
here is

no pulmonic valvular regurgitation.

Great Vessels

Borderline aortic root dilatation. Mildly dilated ascending aorta.

Pericardium/Pleura

There is no pericardial effusion.

______________________________________________________________________________





Interpretation Summary

Left ventricular systolic function is grossly normal.

Ejection Fraction = 50-55%.

There is mild mitral regurgitation.

There is mild tricuspid regurgitation.

Mild aortic regurgitation.

Borderline aortic root dilatation.

Mildly dilated ascending aorta.

There is no pericardial effusion.





MD Ziegler *Hai 2019 01:17 PM

## 2019-04-19 NOTE — EKG
Test Reason : 

Blood Pressure : ***/*** mmHG

Vent. Rate : 088 BPM     Atrial Rate : 088 BPM

   P-R Int : 166 ms          QRS Dur : 088 ms

    QT Int : 378 ms       P-R-T Axes : 022 -09 024 degrees

   QTc Int : 457 ms

 

*** POOR DATA QUALITY, INTERPRETATION MAY BE ADVERSELY AFFECTED

NORMAL SINUS RHYTHM

NONSPECIFIC ST AND T WAVE ABNORMALITY

WHEN COMPARED WITH ECG OF 16-MAY-2016 10:10,

NO SIGNIFICANT CHANGE WAS FOUND

Confirmed by GINI BARROS MD ([id number]) on 4/19/2019 10:48:23 AM

 

Referred By:             Confirmed By:GINI BARROS MD

## 2019-04-19 NOTE — CON.PULM
Consult


Consult Specialty:: PULMONARY


Referred by:: LUIS


Reason for Consultation:: COUGH/SOB





- History of Present Illness


Chief Complaint: COUGH/WHEEZE/SOB


History of Present Illness: 





74 year old woman history decubitus sacral ulcer, advanced Parkinson's disease ,

predominantly bedbound, presents with cough and wheeze for 2 days. The patient 

had a cough noted last night by  who called PCP Shaniqua who prescribed 

Levaquin for concern for PNA. Today the patient was having her sacral wound 

redressed by nursing aid who noted that the patient was wheezing and called 

EMS. At bedside patient reports feeling congested and shortness of breath when 

lying back. The patient has not had any fever, denies production of phlegm in 

cough, denies chest pain, denies nausea, vomiting, diarrhea, constipation, 

dysuria, hematuria.Patient has had wound vac for sacral ulcer for some time and 

had it removed yesterday in wound clinic at this hospital, but the wound vac 

has not been replaced. 








- History Source


History Provided By: Family Member, Medical Record


Limitations to Obtaining History: Dementia





- Past Medical History


CNS: Yes: Parkinson's


Cardio/Vascular: No: AFIB


Pulmonary: No: O2 Dependent


Gastrointestinal: No: Ascites


Hepatobiliary: No: Cirrhosis


Renal/: No: Renal Failure


Reproductive: Yes: Postmenopausal


...Pregnant: No


Heme/Onc: Yes: Anemia


Psych: No: Addictions





- Alcohol/Substance Use


Hx Alcohol Use: No





- Smoking History


Smoking history: Never smoked


Have you smoked in the past 12 months: No


Aproximately how many cigarettes per day: 0





Home Medications





- Allergies


Allergies/Adverse Reactions: 


 Allergies











Allergy/AdvReac Type Severity Reaction Status Date / Time


 


codeine Allergy Intermediate Itching Verified 04/18/19 14:51


 


Penicillins Allergy Intermediate Rash Verified 04/18/19 14:51














- Home Medications


Home Medications: 


Ambulatory Orders





Carbidopa/Levodopa [Rytary ER 36.25 mg-145 mg Cap] 1 each PO QID 05/16/16 


Rotigotine [Neupro] 1 each TD DAILY 05/16/16 


Sennosides/Docusate Sodium [Stool Softener-Laxative Tab] 1 each PO DAILY 05/16/ 16 


Cefuroxime Axetil [Ceftin -] 500 mg PO BID 04/03/19 


Quetiapine Fumarate [Seroquel -] 25 mg PO TID 04/18/19 











Family Disease History





- Family Disease History


Family History: Unable to Obtain





Review of Systems


Unable to obtain ROS, reason: unable to obtain





Physical Exam


Vital Sings: 


 Vital Signs











Temperature  97.7 F   04/19/19 08:43


 


Pulse Rate  42 L  04/19/19 08:43


 


Respiratory Rate  18   04/19/19 08:43


 


Blood Pressure  107/53 L  04/19/19 08:43


 


O2 Sat by Pulse Oximetry (%)  95   04/19/19 08:37











Constitutional: Yes: Anxious


Eyes: Yes: Conjunctiva Clear


HENT: Yes: Normocephalic


Neck: Yes: Trachea Midline


Cardiovascular: Yes: Regular Rate and Rhythm, S1, S2


Respiratory: Yes: Poor Air Entry, Rales, Rhonchi


Gastrointestinal: Yes: Normal Bowel Sounds, Soft


Musculoskeletal: Yes: Muscle Weakness


Edema: No


Neurological: Yes: Oriented, Pre-Existing Deficit


Labs: 


 CBC, BMP





 04/19/19 05:30 





 04/19/19 05:30 





rest of labs reviewed





Imaging





- Results


Chest X-ray: Report Reviewed, Image Reviewed


Cat Scan: Report Reviewed, Image Reviewed





Problem List





- Problems


(1) Aspiration into lower respiratory tract


Code(s): T17.800A - UNSP FOREIGN BODY IN OTH PRT RESP TRACT CAUSING ASPHYX, 

INIT   





(2) Cough


Code(s): R05 - COUGH   





(3) Hypoxia


Code(s): R09.02 - HYPOXEMIA   





(4) SOB (shortness of breath)


Code(s): R06.02 - SHORTNESS OF BREATH   





(5) Sacral decubitus ulcer, stage IV


Code(s): L89.154 - PRESSURE ULCER OF SACRAL REGION, STAGE 4   





(6) Parkinson's disease


Code(s): G20 - PARKINSON'S DISEASE   





Assessment/Plan





GIVEN ADVANCED STAGE OF PARKINSONS AND UNDERLYING DEBILITATION CHRONIC 

ASPIRATION WOULD BE OF CONCERN


 DENIES PRIOR SWALLOWING STUDY


PATIENT IS UNDER CARE OF DR OCHOA/DR MATA





WILL ORDER SWALLOW EVAL WITH DIYA AVILA AND NEURO F/U WITH DR OCHOA


ASPIRATION PRECAUTIONS/SUPPLEMENTAL O2 AS NEEDED


INCIDENTAL FINDING OF DESCENDING AORTIC HEMATOMA IS OF LITTLE CONCERN AS PER 

VASCULAR





WILL FOLLOW





THANK YOU.





EVELIO MCCLURE MD

## 2019-04-19 NOTE — PN
Progress Note (short form)





- Note


Progress Note: 





Pt seen/ examined


chart reviewed


awake/ comfortable


family at bedside-- 


just did echo


 Vital Signs











Temp  97.7 F   04/19/19 08:43


 


Pulse  42 L  04/19/19 08:43


 


Resp  18   04/19/19 08:43


 


BP  107/53 L  04/19/19 08:43


 


Pulse Ox  95   04/19/19 08:37








 Intake & Output











 04/18/19 04/18/19 04/19/19





 11:59 23:59 11:59


 


Intake Total   10


 


Balance   10


 


Weight  140 lb 136 lb 4.8 oz


 


Intake:   


 


  IV   10


 


    SALINE LOCK   10


 


  Oral   0


 


Other:   


 


  Voiding Method   Incontinent


 


  # Unmeasured Voids   


 


    Void   1


 


  Height  5 ft 6 in 5 ft 6 in


 


  Body Mass Index (BMI)  22.6 21.9


 


  Weight Measurement Method   Built in [facility]


 


  Weight Measurement Method  Est/Stated by Patient 








Active Medications





Lisinopril (Prinivil)  5 mg PO DAILY [facility]


   Last Admin: 04/19/19 10:39 Dose:  5 mg


Metoprolol Tartrate (Lopressor -)  25 mg PO BID [facility]


   Last Admin: 04/19/19 10:39 Dose:  25 mg


Non-Formulary Medication (Carbidopa/Levodopa [Rytary Er 36.25 Mg-145 Mg Cap])  

1 each PO QID [facility]


Non-Formulary Medication (Rotigotine [Neupro])  1 each TD DAILY [facility]


Quetiapine Fumarate (Seroquel -)  25 mg PO TID [facility]


   Last Admin: 04/19/19 06:39 Dose:  Not Given


Senna/Docusate Sodium (Pericolace -)  1 tablet PO DAILY [facility]


   Last Admin: 04/19/19 10:39 Dose:  1 tablet





 CBC, BMP





 04/19/19 05:30 





 04/19/19 05:30 











cta / cxr reviewed-- Incidental finding of desc aorta hematoma











Physical Exam


S1 S2 RRR


Lungs - diminished at bases 


abd- soft


ext- no edema


neuro- awake


sacral decubitus





PLAN





stable


continue present care 


scd stockings


discussed with Dr. Perez and Dr Keane


Per Dr. Keane - nothing rasheeda to be done -- can have heparin s/q


will review echo


cardiology to follow


decubitus care


will follow

















Problem List





- Problems


(1) Sacral decubitus ulcer, stage IV


Code(s): L89.154 - PRESSURE ULCER OF SACRAL REGION, STAGE 4   





(2) Cough


Code(s): R05 - COUGH   





(3) SOB (shortness of breath)


Code(s): R06.02 - SHORTNESS OF BREATH   





(4) Parkinson's disease


Code(s): G20 - PARKINSON'S DISEASE

## 2019-04-19 NOTE — CON.CARD
Consult


Consult Specialty:: Cardiology


Reason for Consultation:: Dyspnea





- History of Present Illness


History of Present Illness: 


74 year old woman history decubitus sacral ulcer, advanced Parkinson's disease ,

predominantly bedbound, presents with cough and wheeze . The patient had a 

cough noted last night by . There is no prior history of CAD, CHF or 

arrhythmia. No reports of chest pain or palpitations. The patient has not had 

any fever, denies production of phlegm in cough.


CXR and CT sscan does not show infiltrate or edema


An echocardiogram showed normal LV function without valvular pathology. 


Telemetry shows sinus bradycardia 





- History Source


History Provided By: Family Member, Medical Record





- Past Medical History


CNS: Yes: Parkinson's


Cardio/Vascular: No: AFIB


Pulmonary: No: O2 Dependent


Gastrointestinal: No: Ascites


Hepatobiliary: No: Cirrhosis


Renal/: No: Renal Failure


...Pregnant: No


Psych: No: Addictions





- Alcohol/Substance Use


Hx Alcohol Use: No





- Smoking History


Smoking history: Never smoked


Have you smoked in the past 12 months: No


Aproximately how many cigarettes per day: 0





Home Medications





- Allergies


Allergies/Adverse Reactions: 


 Allergies











Allergy/AdvReac Type Severity Reaction Status Date / Time


 


codeine Allergy Intermediate Itching Verified 04/18/19 14:51


 


Penicillins Allergy Intermediate Rash Verified 04/18/19 14:51














- Home Medications


Home Medications: 


Ambulatory Orders





Carbidopa/Levodopa [Rytary ER 36.25 mg-145 mg Cap] 1 each PO QID 05/16/16 


Rotigotine [Neupro] 1 each TD DAILY 05/16/16 


Sennosides/Docusate Sodium [Stool Softener-Laxative Tab] 1 each PO DAILY 05/16/ 16 


Cefuroxime Axetil [Ceftin -] 500 mg PO BID 04/03/19 


Quetiapine Fumarate [Seroquel -] 25 mg PO TID 04/18/19 











Review of Systems


Unable to obtain ROS, reason: Dementia


Vital Signs: 


 Vital Signs











Temperature  97.7 F   04/19/19 13:00


 


Pulse Rate  50 L  04/19/19 13:00


 


Respiratory Rate  18   04/19/19 13:00


 


Blood Pressure  98/52 L  04/19/19 13:00


 


O2 Sat by Pulse Oximetry (%)  95   04/19/19 13:00











Constitutional: Yes: No Distress, Calm, Cachectic


Eyes: Yes: Conjunctiva Clear, EOM Intact


HENT: Yes: Atraumatic, Normocephalic


Neck: Yes: Supple, Trachea Midline


Respiratory: Yes: Regular.  No: CTA Bilaterally (poor effort. Upper airway 

sounds.)


Gastrointestinal: Yes: Normal Bowel Sounds


Cardiovascular: Yes: Regular Rate and Rhythm, Bradycardia


JVD: No


Carotid Bruit: No


PMI: Non-Displaced


Heart Sounds: Yes: S1, S2


Murmur: No: Systolic Murmur, Diastolic Murmur


Edema: No





- Other Data


Labs, Other Data: 


 CBC, BMP





 04/19/19 05:30 





 04/19/19 05:30 





 INR, PTT











INR  1.28  (0.83-1.09)  H  04/18/19  15:20    








 Troponin, BNP











  04/18/19





  15:27


 


Troponin I  < 0.02


 


B-Natriuretic Peptide  1036.6 H








 Troponin, BNP











  04/18/19





  15:27


 


Troponin I  < 0.02


 


B-Natriuretic Peptide  1036.6 H











Echo: Report Reviewed





Imaging





- Results


EKG: Image Reviewed (NSR no ST T changes.)





Problem List





- Problems


(1) Aspiration into lower respiratory tract


Code(s): T17.800A - UNSP FOREIGN BODY IN OTH PRT RESP TRACT CAUSING ASPHYX, 

INIT   





(2) CHF exacerbation


Code(s): I50.9 - HEART FAILURE, UNSPECIFIED   


Qualifiers: 


   Heart failure type: unspecified   Qualified Code(s): I50.9 - Heart failure, 

unspecified   





Assessment/Plan





74 year old woman advanced Parkinson's disease ,predominantly bedbound, 

presents with cough and wheeze .  There is no prior history of CAD, CHF or 

arrhythmia. No reports of chest pain or palpitations. The patient has not had 

any fever, denies production of phlegm in cough.


CXR and CT sscan does not show infiltrate or edema


An echocardiogram showed normal LV function without valvular pathology. 


Telemetry shows sinus bradycardia 





She has marked sinus bradycardia on Metoprolol will DC.


Mildly elevated BNP without clinical, echocardiographic or imaging to support 

heart failure. 


Pulmonary and speech and swallow eval.


Will see as needed.

## 2019-04-19 NOTE — CONSULT
- Consultation


REQUESTING PROVIDER: 





CONSULT REQUEST: We have been asked to surgically evaluate this patient for (

sacral ulcer).





PCP:Eliecer Camarillo





HISTORY OF PRESENT ILLNESS:


75 y/o F w/ PMHx decubitus sacral ulcer, advanced Parkinson's disease (bedbound

) a/w presents with cough and wheezing. Per Rn pt has had wound vac therapy at 

home for the sacral ulcer. Wound vac dressing removed by ER staff. 





PMHx:          as above 





PSHx:        unknown


 Home Medications











 Medication  Instructions  Recorded


 


Carbidopa/Levodopa [Rytary ER 1 each PO QID 05/16/16





36.25 mg-145 mg Cap]  


 


Rotigotine [Neupro] 1 each TD DAILY 05/16/16


 


Sennosides/Docusate Sodium [Stool 1 each PO DAILY 05/16/16





Softener-Laxative Tab]  


 


Cefuroxime Axetil [Ceftin -] 500 mg PO BID 04/03/19


 


Quetiapine Fumarate [Seroquel -] 25 mg PO TID 04/18/19








 Allergies











Allergy/AdvReac Type Severity Reaction Status Date / Time


 


codeine Allergy Intermediate Itching Verified 04/18/19 14:51


 


Penicillins Allergy Intermediate Rash Verified 04/18/19 14:51








REVIEW OF SYSTEMS: pt nonverbal





PHYSICAL EXAM:


GENERAL: Awake, alert, and fully oriented, in no acute distress.


HEAD: Normal with no signs of trauma.


Back: Lower back with 5x3x3.5 cm sacral ulcer just above gluteal cleft, approx 

2cm on undermining on the left lateral side. Ulcer clean based with scant 

fibrinous exudate centrally. No erythema. Scant drainage. Skin intact 

circumferentially. 

















 Vital Signs











Temperature  97.7 F   04/19/19 08:43


 


Pulse Rate  42 L  04/19/19 08:43


 


Respiratory Rate  18   04/19/19 08:43


 


Blood Pressure  107/53 L  04/19/19 08:43


 


O2 Sat by Pulse Oximetry (%)  95   04/19/19 08:37








 Lab Results











WBC  2.7 K/mm3 (4.0-10.0)  L  04/19/19  05:30    


 


RBC  3.78 M/mm3 (3.60-5.2)   04/19/19  05:30    


 


Hgb  11.6 GM/dL (10.7-15.3)   04/19/19  05:30    


 


Hct  34.3 % (32.4-45.2)   04/19/19  05:30    


 


MCV  90.9 fl (80-96)   04/19/19  05:30    


 


MCHC  33.8 g/dl (32.0-36.0)   04/19/19  05:30    


 


RDW  13.6 % (11.6-15.6)   04/19/19  05:30    


 


Plt Count  352 K/MM3 (134-434)   04/19/19  05:30    


 


Sodium  139 mmol/L (136-145)   04/19/19  05:30    


 


Potassium  4.1 mmol/L (3.5-5.1)   04/19/19  05:30    


 


Chloride  102 mmol/L ()   04/19/19  05:30    


 


Carbon Dioxide  27 mmol/L (21-32)   04/19/19  05:30    


 


Anion Gap  9 MMOL/L (8-16)   04/19/19  05:30    


 


BUN  14 mg/dL (7-18)   04/19/19  05:30    


 


Creatinine  0.6 mg/dL (0.55-1.3)   04/19/19  05:30    


 


Random Glucose  133 mg/dL ()  H  04/19/19  05:30    


 


Calcium  9.0 mg/dL (8.5-10.1)   04/19/19  05:30    


 


INR  1.28  (0.83-1.09)  H  04/18/19  15:20    














A/P: 75 y/o F w/ PMHx decubitus sacral ulcer, advanced Parkinson's disease (

bedbound) a/w presents with cough and wheezing. 








Sacral ulcer, appears to be healing/nat well with use of wound vac





-Wound vac orders placed


-Apply wound vac and set to 125mmHG


-Reposition every two hours while in bed


-Air mattress recommended


-Use drawsheets and Trendelenburg when repositioning to reduce friction and 

shear 


-Manageincontinence via timely cleansing, use of appropriate incontinence 

disposables and use of barrier ointment to intact skin


-Ensure adequate hydration/nutrition, supplementation per primary team


-Ensure off-loading to all bony areas (heels, ankles, hips and tailbone) with 

Allevyn/Optifoam








d/w attending Dr Keane

## 2019-04-20 RX ADMIN — QUETIAPINE FUMARATE SCH MG: 25 TABLET ORAL at 21:05

## 2019-04-20 RX ADMIN — LISINOPRIL SCH MG: 5 TABLET ORAL at 10:36

## 2019-04-20 RX ADMIN — Medication SCH EACH: at 13:36

## 2019-04-20 RX ADMIN — Medication SCH EACH: at 21:04

## 2019-04-20 RX ADMIN — DOCUSATE SODIUM,SENNOSIDES SCH TABLET: 50; 8.6 TABLET, FILM COATED ORAL at 10:36

## 2019-04-20 RX ADMIN — QUETIAPINE FUMARATE SCH MG: 25 TABLET ORAL at 05:13

## 2019-04-20 RX ADMIN — HEPARIN SODIUM SCH UNIT: 5000 INJECTION, SOLUTION INTRAVENOUS; SUBCUTANEOUS at 10:36

## 2019-04-20 RX ADMIN — Medication SCH EACH: at 18:03

## 2019-04-20 RX ADMIN — HEPARIN SODIUM SCH UNIT: 5000 INJECTION, SOLUTION INTRAVENOUS; SUBCUTANEOUS at 21:05

## 2019-04-20 RX ADMIN — Medication SCH EACH: at 10:35

## 2019-04-20 RX ADMIN — QUETIAPINE FUMARATE SCH MG: 25 TABLET ORAL at 13:44

## 2019-04-20 NOTE — PN
Progress Note (short form)





- Note


Progress Note: 





PULMONARY





Appears lethargic








Constitutional: Yes: Anxious


Eyes: Yes: Conjunctiva Clear


HENT: Yes: Normocephalic


Neck: Yes: Trachea Midline


Cardiovascular: Yes: Regular Rate and Rhythm, S1, S2


Respiratory: Yes: Poor Air Entry, Rales, Rhonchi


Gastrointestinal: Yes: Normal Bowel Sounds, Soft


Musculoskeletal: Yes: Muscle Weakness


Edema: No


Neurological: Yes: Oriented, Pre-Existing Deficit





Labs/meds/notes/images reviewed








(1) Aspiration into lower respiratory tract


Code(s): T17.800A - UNSP FOREIGN BODY IN OTH PRT RESP TRACT CAUSING ASPHYX, 

INIT   





(2) Cough


Code(s): R05 - COUGH   





(3) Hypoxia


Code(s): R09.02 - HYPOXEMIA   





(4) SOB (shortness of breath)


Code(s): R06.02 - SHORTNESS OF BREATH   





(5) Sacral decubitus ulcer, stage IV


Code(s): L89.154 - PRESSURE ULCER OF SACRAL REGION, STAGE 4   





(6) Parkinson's disease


Code(s): G20 - PARKINSON'S DISEASE   





Assessment/Plan





GIVEN ADVANCED STAGE OF PARKINSONS AND UNDERLYING DEBILITATION CHRONIC 

ASPIRATION WOULD BE OF CONCERN


 DENIES PRIOR SWALLOWING STUDY


PATIENT IS UNDER CARE OF DR OCHOA/DR MATA





WILL ORDER SWALLOW EVAL WITH DIYA AVILA AND NEURO F/U WITH DR OCHOA


ASPIRATION PRECAUTIONS/SUPPLEMENTAL O2 AS NEEDED


INCIDENTAL FINDING OF DESCENDING AORTIC HEMATOMA IS OF LITTLE CONCERN AS PER 

VASCULAR





EVELIO MCCLURE MD








Problem List





- Problems


(1) Aspiration into lower respiratory tract


Code(s): T17.800A - UNSP FOREIGN BODY IN OTH PRT RESP TRACT CAUSING ASPHYX, 

INIT   





(2) Cough


Code(s): R05 - COUGH   





(3) Hypoxia


Code(s): R09.02 - HYPOXEMIA   





(4) SOB (shortness of breath)


Code(s): R06.02 - SHORTNESS OF BREATH   





(5) Sacral decubitus ulcer, stage IV


Code(s): L89.154 - PRESSURE ULCER OF SACRAL REGION, STAGE 4   





(6) Parkinson's disease


Code(s): G20 - PARKINSON'S DISEASE

## 2019-04-20 NOTE — CONSULT
Consult - text type





- Consultation


Consultation Note: 





 Neurology


CHIEF COMPLAINT:  shortness of breath 





PCP: Fortunato  





HISTORY OF PRESENT ILLNESS:


74 year old woman history decubitus sacral ulcer, advanced Parkinson's disease (

bedbound) presents with cough and wheeze for 2 days. The patient had a cough 

noted last night by  who called PCP Fortunato who prescribed Levaquin for 

concern for PNA, found to have wheezing by visiting nurse, sent in to hospital 

for evaluation. Getting ongoing management and medical optimization. Discussed 

with PCP. Also with h/o Parkinson's and had an extensive conversation with 

 at bedside. Patient on Rytary (nonformulary here) which helped improved 

her tremors significantly. She is a patient of Dr. Duran, who I'm covering, 

and at this time would not make change to her regiment. He is in agreement with 

this. He did mention that Neuplacid was not helpful and has follow up with Dr. Duran in May when he will discuss further. Consult also for AMS which seems 

she has improved with medical optimization. She has baseline bradykinesia with 

masked facies. She was awake, alert, and communicative with addressed. 


 


Recent Travel: no 





PAST MEDICAL HISTORY: decubitus sacral ulcer, advanced Parkinson's disease (

bedbound)





PAST SURGICAL HISTORY: no 





Social History:


Smoking: former smoker 


Alcohol: no 


Drugs:  no 





Family History: HTN





Allergies





codeine Allergy (Intermediate, Verified 04/18/19 14:51)


 Itching


Penicillins Allergy (Intermediate, Verified 04/18/19 14:51)


 Rash








HOME MEDICATIONS:


 Home Medications











 Medication  Instructions  Recorded


 


Carbidopa/Levodopa [Rytary ER 1 each PO QID 05/16/16





36.25 mg-145 mg Cap]  


 


Rotigotine [Neupro] 1 each TD DAILY 05/16/16


 


Sennosides/Docusate Sodium [Stool 1 each PO DAILY 05/16/16





Softener-Laxative Tab]  


 


Cefuroxime Axetil [Ceftin -] 500 mg PO BID 04/03/19


 


Quetiapine Fumarate [Seroquel -] 25 mg PO TID 04/18/19








REVIEW OF SYSTEMS


CONSTITUTIONAL: 


Absent:  fever, chills, diaphoresis, generalized weakness, malaise, loss of 

appetite, weight change


HEENT: 


Absent:  rhinorrhea, nasal congestion, throat pain, throat swelling, difficulty 

swallowing, mouth swelling, ear pain, eye pain, visual changes


CARDIOVASCULAR: 


Absent: chest pain, syncope, palpitations, irregular heart rate, lightheadedness

, peripheral edema


RESPIRATORY: 


Absent: cough, dyspnea with exertion, orthopnea, wheezing, stridor, hemoptysis


present -  shortness of breath,


GASTROINTESTINAL:


Absent: abdominal pain, abdominal distension, nausea, vomiting, diarrhea, 

constipation, melena, hematochezia


GENITOURINARY: 


Absent: dysuria, frequency, urgency, hesitancy, hematuria, flank pain, genital 

pain


MUSCULOSKELETAL: 


Absent: myalgia, arthralgia, joint swelling, back pain, neck pain


SKIN: 


Absent: rash, itching, pallor


HEMATOLOGIC/IMMUNOLOGIC: 


Absent: easy bleeding, easy bruising, lymphadenopathy, frequent infections


ENDOCRINE:


Absent: unexplained weight gain, unexplained weight loss, heat intolerance, 

cold intolerance


NEUROLOGIC: 


Absent: headache, focal weakness or paresthesias, dizziness, unsteady gait, 

seizure, mental status changes, bladder or bowel incontinence


PSYCHIATRIC: 


Absent: anxiety, depression, suicidal or homicidal ideation, hallucinations.








PHYSICAL EXAMINATION


  Vital Signs











 Period  Temp  Pulse  Resp  BP Sys/Lundberg  Pulse Ox


 


 Last 24 Hr  97.7 F-99.0 F  50-77  18-20  /48-71  95-97














GENERAL: Awake, alert, disoriented 


HEAD: Normal with no signs of trauma.


EYES: Pupils equal, round and reactive to light, extraocular movements intact, 

sclera anicteric, conjunctiva clear. No lid lag.


EARS, NOSE, THROAT: Ears normal, nares patent, oropharynx clear without 

exudates. Moist mucous membranes.


NECK: Normal range of motion, supple without lymphadenopathy, JVD, or masses.


LUNGS: some coarse breaths sounds b/l, no wheezing appreciated 


HEART: Regular rate and rhythm, normal S1 and S2 without murmur, rub or gallop.


ABDOMEN: Soft, nontender, not distended, normoactive bowel sounds, no guarding, 

no rebound, no masses.  No hepatomegaly or  splenomegaly. 


MUSCULOSKELETAL: Normal range of motion at all joints. No bony deformities or 

tenderness. No CVA tenderness.


UPPER EXTREMITIES: 2+ pulses, warm, well-perfused. No cyanosis. No clubbing. No 

peripheral edema.


LOWER EXTREMITIES: 2+ pulses, warm, well-perfused. No calf tenderness. No 

peripheral edema. 


NEUROLOGICAL:  Cranial nerves II-XII intact. dementia, bed bound 


PSYCHIATRIC: dementia 


SKIN: Warm, dry, normal turgor, no rashes


stage 4 sacral decubitus ulcer  








 Laboratory Results - last 24 hr











  04/18/19 04/18/19 04/18/19





  15:20 15:20 15:20


 


WBC   


 


RBC   


 


Hgb   


 


Hct   


 


MCV   


 


MCH   


 


MCHC   


 


RDW   


 


Plt Count   


 


MPV   


 


Absolute Neuts (auto)   


 


Neutrophils %   


 


Lymphocytes %   


 


Monocytes %   


 


Eosinophils %   


 


Basophils %   


 


Nucleated RBC %   


 


PT with INR  15.20 H  


 


INR  1.28 H  


 


PTT (Actin FS)  24.1 L  


 


D-Dimer   


 


VBG pH   Cancelled 


 


POC VBG pCO2   Cancelled 


 


POC VBG pO2   Cancelled 


 


VBG HCO3   Cancelled 


 


VBG O2 Sat ([place])   Cancelled 


 


VBG Base Excess   Cancelled 


 


Sodium   


 


Potassium   


 


Chloride   


 


Carbon Dioxide   


 


Anion Gap   


 


BUN   


 


Creatinine   


 


Creat Clearance w eGFR   


 


Random Glucose   


 


Lactic Acid    1.1


 


Calcium   


 


Total Bilirubin   


 


AST   


 


ALT   


 


Alkaline Phosphatase   


 


Troponin I   


 


B-Natriuretic Peptide   


 


Total Protein   


 


Albumin   


 


Urine Color   


 


Urine Appearance   


 


Urine pH   


 


Ur Specific Gravity   


 


Urine Protein   


 


Urine Glucose (UA)   


 


Urine Ketones   


 


Urine Blood   


 


Urine Nitrite   


 


Urine Bilirubin   


 


Urine Urobilinogen   


 


Ur Leukocyte Esterase   


 


Urine WBC (Auto)   


 


Urine RBC (Auto)   


 


Urine Casts (Auto)   


 


U Epithel Cells (Auto)   


 


Urine Bacteria (Auto)   


 


Influenza A (Rapid)   


 


Influenza B (Rapid)   














  04/18/19 04/18/19 04/18/19





  15:27 15:27 16:10


 


WBC  8.1  


 


RBC  3.75  


 


Hgb  11.5  


 


Hct  35.0  


 


MCV  93.5  


 


MCH  30.6  


 


MCHC  32.8  


 


RDW  13.5  


 


Plt Count  371  


 


MPV  6.7 L  


 


Absolute Neuts (auto)  7.1  


 


Neutrophils %  88.2 H  


 


Lymphocytes %  2.3 L D  


 


Monocytes %  8.7  


 


Eosinophils %  0.4  


 


Basophils %  0.4  


 


Nucleated RBC %  0  


 


PT with INR   


 


INR   


 


PTT (Actin FS)   


 


D-Dimer   


 


VBG pH   


 


POC VBG pCO2   


 


POC VBG pO2   


 


VBG HCO3   


 


VBG O2 Sat ([place])   


 


VBG Base Excess   


 


Sodium   137 


 


Potassium   4.8 


 


Chloride   103 


 


Carbon Dioxide   27 


 


Anion Gap   7 L 


 


BUN   16 


 


Creatinine   0.7 


 


Creat Clearance w eGFR   81.36 


 


Random Glucose   94 


 


Lactic Acid   


 


Calcium   9.0 


 


Total Bilirubin   0.4 


 


AST   25 


 


ALT   6 L 


 


Alkaline Phosphatase   97 


 


Troponin I   < 0.02 


 


B-Natriuretic Peptide   1036.6 H 


 


Total Protein   7.4 


 


Albumin   3.0 L 


 


Urine Color    Yellow


 


Urine Appearance    Turbid


 


Urine pH    6.5


 


Ur Specific Gravity    1.020


 


Urine Protein    Trace


 


Urine Glucose (UA)    Negative


 


Urine Ketones    Trace H


 


Urine Blood    Negative


 


Urine Nitrite    Positive H


 


Urine Bilirubin    Negative


 


Urine Urobilinogen    1.0


 


Ur Leukocyte Esterase    3+ H


 


Urine WBC (Auto)    428


 


Urine RBC (Auto)    8.1


 


Urine Casts (Auto)    64


 


U Epithel Cells (Auto)    2.4


 


Urine Bacteria (Auto)    8239.4


 


Influenza A (Rapid)   


 


Influenza B (Rapid)   














  04/18/19 04/18/19





  16:10 16:10


 


WBC  


 


RBC  


 


Hgb  


 


Hct  


 


MCV  


 


MCH  


 


MCHC  


 


RDW  


 


Plt Count  


 


MPV  


 


Absolute Neuts (auto)  


 


Neutrophils %  


 


Lymphocytes %  


 


Monocytes %  


 


Eosinophils %  


 


Basophils %  


 


Nucleated RBC %  


 


PT with INR  


 


INR  


 


PTT (Actin FS)  


 


D-Dimer  2180 H 


 


VBG pH  


 


POC VBG pCO2  


 


POC VBG pO2  


 


VBG HCO3  


 


VBG O2 Sat ([place])  


 


VBG Base Excess  


 


Sodium  


 


Potassium  


 


Chloride  


 


Carbon Dioxide  


 


Anion Gap  


 


BUN  


 


Creatinine  


 


Creat Clearance w eGFR  


 


Random Glucose  


 


Lactic Acid  


 


Calcium  


 


Total Bilirubin  


 


AST  


 


ALT  


 


Alkaline Phosphatase  


 


Troponin I  


 


B-Natriuretic Peptide  


 


Total Protein  


 


Albumin  


 


Urine Color  


 


Urine Appearance  


 


Urine pH  


 


Ur Specific Gravity  


 


Urine Protein  


 


Urine Glucose (UA)  


 


Urine Ketones  


 


Urine Blood  


 


Urine Nitrite  


 


Urine Bilirubin  


 


Urine Urobilinogen  


 


Ur Leukocyte Esterase  


 


Urine WBC (Auto)  


 


Urine RBC (Auto)  


 


Urine Casts (Auto)  


 


U Epithel Cells (Auto)  


 


Urine Bacteria (Auto)  


 


Influenza A (Rapid)   Negative


 


Influenza B (Rapid)   Negative





imaging studies reviewed 





ASSESSMENT/PLAN:


74 year old woman history decubitus sacral ulcer, advanced Parkinson's disease (

bedbound) presents with cough and wheeze for 2 days. The patient had a cough 

noted last night by  who called PCP Fortunato who prescribed Levaquin for 

concern for PNA, found to have wheezing by visiting nurse, sent in to hospital 

for evaluation. Getting ongoing management and medical optimization. Discussed 

with PCP. Also with h/o Parkinson's and had an extensive conversation with 

 at bedside. Patient on Rytary (nonformulary here) which helped improved 

her tremors significantly. She is a patient of Dr. Duran, who I'm covering, 

and at this time would not make change to her regiment. He is in agreement with 

this. He did mention that Neuplacid was not helpful and has follow up with Dr. Duran in May when he will discuss further. Consult also for AMS which seems 

she has improved with medical optimization. She has baseline bradykinesia with 

masked facies. She was awake, alert, and communicative with addressed. 

Continued medical mgmt, pulmonary treatment ongoing. Monitor decubs as can 

precipitate AMS with infectious process. As noted, patient to see Dr. Duran 

as outpatient and remains stable on Rytary.

## 2019-04-21 LAB
ALBUMIN SERPL-MCNC: 2.5 G/DL (ref 3.4–5)
ALP SERPL-CCNC: 71 U/L (ref 45–117)
ALT SERPL-CCNC: < 6 U/L (ref 13–61)
ANION GAP SERPL CALC-SCNC: 7 MMOL/L (ref 8–16)
AST SERPL-CCNC: 7 U/L (ref 15–37)
BASOPHILS # BLD: 0.1 % (ref 0–2)
BILIRUB SERPL-MCNC: 0.3 MG/DL (ref 0.2–1)
BUN SERPL-MCNC: 26 MG/DL (ref 7–18)
CALCIUM SERPL-MCNC: 8.9 MG/DL (ref 8.5–10.1)
CHLORIDE SERPL-SCNC: 104 MMOL/L (ref 98–107)
CO2 SERPL-SCNC: 29 MMOL/L (ref 21–32)
CREAT SERPL-MCNC: 0.7 MG/DL (ref 0.55–1.3)
DEPRECATED RDW RBC AUTO: 13.7 % (ref 11.6–15.6)
EOSINOPHIL # BLD: 0.1 % (ref 0–4.5)
GLUCOSE SERPL-MCNC: 93 MG/DL (ref 74–106)
HCT VFR BLD CALC: 33.3 % (ref 32.4–45.2)
HGB BLD-MCNC: 11.2 GM/DL (ref 10.7–15.3)
LYMPHOCYTES # BLD: 10.6 % (ref 8–40)
MCH RBC QN AUTO: 30.9 PG (ref 25.7–33.7)
MCHC RBC AUTO-ENTMCNC: 33.6 G/DL (ref 32–36)
MCV RBC: 91.9 FL (ref 80–96)
MONOCYTES # BLD AUTO: 12.2 % (ref 3.8–10.2)
NEUTROPHILS # BLD: 77 % (ref 42.8–82.8)
PLATELET # BLD AUTO: 358 K/MM3 (ref 134–434)
PMV BLD: 6.6 FL (ref 7.5–11.1)
POTASSIUM SERPLBLD-SCNC: 4 MMOL/L (ref 3.5–5.1)
PROT SERPL-MCNC: 6 G/DL (ref 6.4–8.2)
RBC # BLD AUTO: 3.62 M/MM3 (ref 3.6–5.2)
SODIUM SERPL-SCNC: 140 MMOL/L (ref 136–145)
WBC # BLD AUTO: 7.3 K/MM3 (ref 4–10)

## 2019-04-21 RX ADMIN — Medication SCH EACH: at 17:38

## 2019-04-21 RX ADMIN — Medication SCH EACH: at 14:54

## 2019-04-21 RX ADMIN — LISINOPRIL SCH MG: 5 TABLET ORAL at 10:15

## 2019-04-21 RX ADMIN — HEPARIN SODIUM SCH UNIT: 5000 INJECTION, SOLUTION INTRAVENOUS; SUBCUTANEOUS at 22:17

## 2019-04-21 RX ADMIN — QUETIAPINE FUMARATE SCH MG: 25 TABLET ORAL at 22:17

## 2019-04-21 RX ADMIN — Medication SCH EACH: at 22:16

## 2019-04-21 RX ADMIN — HEPARIN SODIUM SCH UNIT: 5000 INJECTION, SOLUTION INTRAVENOUS; SUBCUTANEOUS at 10:13

## 2019-04-21 RX ADMIN — Medication SCH: at 17:50

## 2019-04-21 RX ADMIN — QUETIAPINE FUMARATE SCH MG: 25 TABLET ORAL at 05:00

## 2019-04-21 RX ADMIN — DOCUSATE SODIUM,SENNOSIDES SCH TABLET: 50; 8.6 TABLET, FILM COATED ORAL at 10:15

## 2019-04-21 RX ADMIN — Medication SCH EACH: at 10:13

## 2019-04-21 RX ADMIN — QUETIAPINE FUMARATE SCH MG: 25 TABLET ORAL at 14:54

## 2019-04-21 NOTE — PN
Progress Note (short form)





- Note


Progress Note: 








 Neurology


CHIEF COMPLAINT:  shortness of breath 





PCP: Fortunato  





HISTORY OF PRESENT ILLNESS:


74 year old woman history decubitus sacral ulcer, advanced Parkinson's disease (

bedbound) presents with cough and wheeze for 2 days prior to admission. The 

patient had a cough noted night prior to admission by  who called PCP 

Fortunato who prescribed Levaquin for concern for PNA, found to have wheezing by 

visiting nurse, sent in to hospital for evaluation. Getting ongoing management 

and medical optimization. Discussed with PCP. Also with h/o Parkinson's and had 

an extensive conversation with  at bedside. Patient on Rytary (

nonformulary here) which helped improved her tremors significantly. She is a 

patient of Dr. Duran, who I'm covering, and at this time would not make 

change to her regiment. He is in agreement with this. He did mention that 

Neuplacid was not helpful and has follow up with Dr. Duran in May when he 

will discuss further. Consult also for AMS which seems she has improved with 

medical optimization. She has baseline bradykinesia with masked facies. She was 

awake, alert, and communicative.  states she was not this awake earlier, 

mental status seems promising. 





Active Medications





Heparin Sodium (Porcine) (Heparin -)  5,000 unit SQ BID [facility]


   Last Admin: 04/21/19 10:13 Dose:  5,000 unit


Levofloxacin (Levaquin 500 Mg Premixed Ivpb -)  500 mg in 100 mls @ 100 mls/hr 

IVPB DAILY [facility]; Protocol


   Last Admin: 04/21/19 10:32 Dose:  100 mls/hr


Lisinopril (Prinivil)  5 mg PO DAILY [facility]


   Last Admin: 04/21/19 10:15 Dose:  5 mg


Non-Formulary Medication (Carbidopa/Levodopa [Rytary Er 36.25 Mg-145 Mg Cap])  

1 each PO QID [facility]


   Last Admin: 04/21/19 10:13 Dose:  1 each


Non-Formulary Medication (Rotigotine [Neupro])  1 each TD DAILY [facility]


Quetiapine Fumarate (Seroquel -)  25 mg PO TID [facility]


   Last Admin: 04/21/19 05:00 Dose:  25 mg


Senna/Docusate Sodium (Pericolace -)  1 tablet PO DAILY [facility]


   Last Admin: 04/21/19 10:15 Dose:  1 tablet











PHYSICAL EXAMINATION


 Vital Signs











Temperature  98.4 F   04/21/19 09:00


 


Pulse Rate  62   04/21/19 09:00


 


Respiratory Rate  18   04/21/19 09:00


 


Blood Pressure  92/49 L  04/21/19 09:00


 


O2 Sat by Pulse Oximetry (%)  98   04/21/19 10:00














GENERAL: Awake, alert, disoriented 


HEAD: Normal with no signs of trauma.


EYES: Pupils equal, round and reactive to light, extraocular movements intact, 

sclera anicteric, conjunctiva clear. No lid lag.


EARS, NOSE, THROAT: Ears normal, nares patent, oropharynx clear without 

exudates. Moist mucous membranes.


NECK: Normal range of motion, supple without lymphadenopathy, JVD, or masses.


LUNGS: some coarse breaths sounds b/l, no wheezing appreciated 


HEART: Regular rate and rhythm, normal S1 and S2 without murmur, rub or gallop.


ABDOMEN: Soft, nontender, not distended, normoactive bowel sounds, no guarding, 

no rebound, no masses.  No hepatomegaly or  splenomegaly. 


MUSCULOSKELETAL: Normal range of motion at all joints. No bony deformities or 

tenderness. No CVA tenderness.


UPPER EXTREMITIES: 2+ pulses, warm, well-perfused. No cyanosis. No clubbing. No 

peripheral edema.


LOWER EXTREMITIES: 2+ pulses, warm, well-perfused. No calf tenderness. No 

peripheral edema. 


NEUROLOGICAL:  Cranial nerves II-XII intact. dementia, bed bound 


PSYCHIATRIC: dementia 


SKIN: Warm, dry, normal turgor, no rashes


stage 4 sacral decubitus ulcer  








 CBCD











WBC  7.3 K/mm3 (4.0-10.0)   04/21/19  05:30    


 


RBC  3.62 M/mm3 (3.60-5.2)   04/21/19  05:30    


 


Hgb  11.2 GM/dL (10.7-15.3)   04/21/19  05:30    


 


Hct  33.3 % (32.4-45.2)   04/21/19  05:30    


 


MCV  91.9 fl (80-96)   04/21/19  05:30    


 


MCHC  33.6 g/dl (32.0-36.0)   04/21/19  05:30    


 


RDW  13.7 % (11.6-15.6)   04/21/19  05:30    


 


Plt Count  358 K/MM3 (134-434)   04/21/19  05:30    


 


MPV  6.6 fl (7.5-11.1)  L  04/21/19  05:30    








 CMP











Sodium  140 mmol/L (136-145)   04/21/19  05:30    


 


Potassium  4.0 mmol/L (3.5-5.1)   04/21/19  05:30    


 


Chloride  104 mmol/L ()   04/21/19  05:30    


 


Carbon Dioxide  29 mmol/L (21-32)   04/21/19  05:30    


 


Anion Gap  7 MMOL/L (8-16)  L  04/21/19  05:30    


 


BUN  26 mg/dL (7-18)  H  04/21/19  05:30    


 


Creatinine  0.7 mg/dL (0.55-1.3)   04/21/19  05:30    


 


Creat Clearance w eGFR  81.36  (>60)   04/21/19  05:30    


 


Random Glucose  93 mg/dL ()   04/21/19  05:30    


 


Calcium  8.9 mg/dL (8.5-10.1)   04/21/19  05:30    


 


Total Bilirubin  0.3 mg/dL (0.2-1)   04/21/19  05:30    


 


AST  7 U/L (15-37)  L  04/21/19  05:30    


 


ALT  < 6 U/L (13-61)  L  04/21/19  05:30    


 


Alkaline Phosphatase  71 U/L ()   04/21/19  05:30    


 


Total Protein  6.0 g/dl (6.4-8.2)  L  04/21/19  05:30    


 


Albumin  2.5 g/dl (3.4-5.0)  L  04/21/19  05:30    








 CARDIAC ENZYMES











Troponin I  < 0.02 ng/ml (0.00-0.05)   04/18/19  15:27    











imaging studies reviewed 





ASSESSMENT/PLAN:


74 year old woman history decubitus sacral ulcer, advanced Parkinson's disease (

bedbound) presents with cough and wheeze for 2 days prior to admission. The 

patient had a cough noted night prior to admission by  who called PCP 

Fortunato who prescribed Levaquin for concern for PNA, found to have wheezing by 

visiting nurse, sent in to hospital for evaluation. Getting ongoing management 

and medical optimization. Discussed with PCP. Also with h/o Parkinson's and had 

an extensive conversation with  at bedside. Patient on Rytary (

nonformulary here) which helped improved her tremors significantly. She is a 

patient of Dr. Duran, who I'm covering, and at this time would not make 

change to her regiment. He is in agreement with this. He did mention that 

Neuplacid was not helpful and has follow up with Dr. Duran in May when he 

will discuss further. Consult also for AMS which seems she has improved with 

medical optimization. She has baseline bradykinesia with masked facies. She was 

awake, alert, and communicative when addressed. Continued medical mgmt, 

pulmonary treatment ongoing. Monitor decubs as can precipitate AMS with 

infectious process. As noted, patient to see Dr. Duran as outpatient and 

remains stable on Rytary. Mental status improving. They will discuss Neuplacid 

with Dr. Duran as outpatient.

## 2019-04-21 NOTE — PN
Progress Note (short form)





- Note


Progress Note: 


Remains same


afebrile





 Vital Signs











Temp  98.2 F   04/21/19 05:00


 


Pulse  58 L  04/21/19 05:00


 


Resp  20   04/21/19 05:00


 


BP  102/64   04/21/19 05:00


 


Pulse Ox  96   04/21/19 05:00








 Intake & Output











 04/20/19 04/20/19 04/21/19





 11:59 23:59 11:59


 


Intake Total 0 470 


 


Balance 0 470 


 


Weight 137 lb 12.8 oz  136 lb 12.8 oz


 


Intake:   


 


  IV 0 10 


 


    SALINE LOCK 0 10 


 


  IVPB  100 


 


  Oral  360 


 


Other:   


 


  Voiding Method Diaper Diaper Diaper


 


  # Unmeasured Voids   


 


    Void  1 


 


  Bowel Movement  Yes 


 


  # Bowel Movements  1 


 


  Weight Measurement Method Patient Lift Scale  Patient Lift Scale








Active Medications





Heparin Sodium (Porcine) (Heparin -)  5,000 unit SQ BID [facility]


   Last Admin: 04/20/19 21:05 Dose:  5,000 unit


Levofloxacin (Levaquin 500 Mg Premixed Ivpb -)  500 mg in 100 mls @ 100 mls/hr 

IVPB DAILY [facility]; Protocol


   Last Admin: 04/20/19 13:35 Dose:  100 mls/hr


Lisinopril (Prinivil)  5 mg PO DAILY [facility]


   Last Admin: 04/20/19 10:36 Dose:  5 mg


Non-Formulary Medication (Carbidopa/Levodopa [Rytary Er 36.25 Mg-145 Mg Cap])  

1 each PO QID [facility]


   Last Admin: 04/20/19 21:04 Dose:  1 each


Non-Formulary Medication (Rotigotine [Neupro])  1 each TD DAILY [facility]


Quetiapine Fumarate (Seroquel -)  25 mg PO TID [facility]


   Last Admin: 04/21/19 05:00 Dose:  25 mg


Senna/Docusate Sodium (Pericolace -)  1 tablet PO DAILY [facility]


   Last Admin: 04/20/19 10:36 Dose:  1 tablet





 CBC, BMP





 04/21/19 05:30 





 04/21/19 05:30 





 Microbiology











 04/18/19 15:27 Blood Culture - Preliminary





 Blood - Peripheral Venous    NO GROWTH OBTAINED AFTER 48 HOURS, INCUBATION TO 

CONTINUE





    FOR 3 DAYS.


 


 04/18/19 15:27 Blood Culture - Preliminary





 Blood - Peripheral Venous    NO GROWTH OBTAINED AFTER 48 HOURS, INCUBATION TO 

CONTINUE





    FOR 3 DAYS.


 


 04/18/19 16:10 Urine Culture - Preliminary





 Urine - Urine Clean Catch    Lactose Fermenting Neg Bacilli











Physical Exam


S1 S2 RRR


Lungs - diminished at bases 


abd- soft


ext- no edema


neuro- awake


sacral decubitus





PLAN





stable


continue present care 


abx


will follow


d/c tele


pt is dnr/di


f/u cultures


swallow eval











Problem List





- Problems


(1) Sacral decubitus ulcer, stage IV


Code(s): L89.154 - PRESSURE ULCER OF SACRAL REGION, STAGE 4   





(2) Cough


Code(s): R05 - COUGH   





(3) SOB (shortness of breath)


Code(s): R06.02 - SHORTNESS OF BREATH   





(4) Parkinson's disease


Code(s): G20 - PARKINSON'S DISEASE

## 2019-04-22 RX ADMIN — Medication SCH EACH: at 22:22

## 2019-04-22 RX ADMIN — QUETIAPINE FUMARATE SCH MG: 25 TABLET ORAL at 05:52

## 2019-04-22 RX ADMIN — Medication SCH EACH: at 13:46

## 2019-04-22 RX ADMIN — IPRATROPIUM BROMIDE AND ALBUTEROL SULFATE SCH AMP: .5; 3 SOLUTION RESPIRATORY (INHALATION) at 19:24

## 2019-04-22 RX ADMIN — HEPARIN SODIUM SCH UNIT: 5000 INJECTION, SOLUTION INTRAVENOUS; SUBCUTANEOUS at 22:14

## 2019-04-22 RX ADMIN — QUETIAPINE FUMARATE SCH MG: 25 TABLET ORAL at 13:45

## 2019-04-22 RX ADMIN — Medication SCH: at 18:36

## 2019-04-22 RX ADMIN — QUETIAPINE FUMARATE SCH MG: 25 TABLET ORAL at 22:12

## 2019-04-22 RX ADMIN — DOCUSATE SODIUM,SENNOSIDES SCH TABLET: 50; 8.6 TABLET, FILM COATED ORAL at 09:55

## 2019-04-22 RX ADMIN — IPRATROPIUM BROMIDE AND ALBUTEROL SULFATE SCH AMP: .5; 3 SOLUTION RESPIRATORY (INHALATION) at 14:45

## 2019-04-22 RX ADMIN — HEPARIN SODIUM SCH UNIT: 5000 INJECTION, SOLUTION INTRAVENOUS; SUBCUTANEOUS at 09:56

## 2019-04-22 RX ADMIN — Medication SCH EACH: at 09:55

## 2019-04-22 NOTE — PN
Progress Note (short form)





- Note


Progress Note: 





PULMONARY





More alert, awake per  at bedside. No fevers recorded.





 Vital Signs











 Period  Temp  Pulse  Resp  BP Sys/Lundberg  Pulse Ox


 


 Last 24 Hr  97.4 F-98.4 F  55-76  18-20  65-96/43-62  97-97








Gen:  NAD at rest


Heart:  RRR


Lung: scattered rhonchi


Abd: soft, nontender


Ext: no edema





 CBC, BMP





 04/21/19 05:30 





 04/21/19 05:30 





Active Medications





Heparin Sodium (Porcine) (Heparin -)  5,000 unit SQ BID [facility]


   Last Admin: 04/22/19 09:56 Dose:  5,000 unit


Levofloxacin (Levaquin 500 Mg Premixed Ivpb -)  500 mg in 100 mls @ 100 mls/hr 

IVPB DAILY [facility]; Protocol


   Last Admin: 04/22/19 09:55 Dose:  100 mls/hr


Non-Formulary Medication (Carbidopa/Levodopa [Rytary Er 36.25 Mg-145 Mg Cap])  

1 each PO QID [facility]


   Last Admin: 04/22/19 09:55 Dose:  1 each


Non-Formulary Medication (Rotigotine [Neupro])  1 each TD DAILY [facility]


Quetiapine Fumarate (Seroquel -)  25 mg PO TID [facility]


   Last Admin: 04/22/19 05:52 Dose:  25 mg


Senna/Docusate Sodium (Pericolace -)  1 tablet PO DAILY [facility]


   Last Admin: 04/22/19 09:55 Dose:  1 tablet





A/P


Pneumonia likely aspiration


Parkinsons 


Dementia


Sacral Decubitus Ulcer





-  continue antibiotics


-  will start inhaled bronchodilators


-  O2 to keep SpO2 >90%


-  aspiration precautions


-  DVT prophylaxis

## 2019-04-22 NOTE — PN
Progress Note (short form)





- Note


Progress Note: 


NEUROLOGY PROGRESS:





Events reviewed and discussed with MONICA Cohen. Coverage from Dr. Sarmiento is 

greatly appreciated.





This 75 yo F is well-known to me for Parkinson's Disease, Parkinson's psychosis

, and OMS. Last seen by me in office 1/17/19. 


Recent changes include: increase in Rytary to (95x2=180) QID; Quetiapine 50 mg 

qam/100 mg qhs, with continuation of Nuplazid (34 mg qd) . D/C rivastigmine.


Admitted after new onset cough and generalized weakness, found to have  urine 

WBC = 428 with+ ESBL in Urine, currently on IV levofloxacin.


Also with wound vac to sacral ulcer.   





YUNG: BPs 80/50s. Cor reg. No bruit. Neck rigid in all directions. Wound vac in 

situ. Wearing diaper.  


NEURO: Eyes closed. sparse, hypophonic speech. Ox to self. Follows one step 

commands. + glabella, grasps


           Eyes respond to threat.  


           Intermittent rhythmic rest tremor both hands with jaw tremor. + 

cogwheeling. Myoclonus of feet. Reflexes symmetric, except absent AJ's. 

Plantars silent.


           Decreased response to pinch in all four's. 





Impression: B/L Cerebral Dysfunction (OMS, chronic) 


                Myoclonic jerks suggestive of Toxic-Metabolic Encephalopathy 


                Parkinson's disease


                Parkinson's disease psychosis 





Suggest: Aggressive fluid hydration and antibiotics 


             Orthostatic BP's


             Optimization of L-dopa once BP stabilizes (on rytary 145 QID 

currently)


             Bedside PT for mobilization vs. contractures 





Thank you very much, 


Rene Duran MD

## 2019-04-22 NOTE — PN
Progress Note (short form)





- Note


Progress Note: 





pt seen/ examined


awake/ comfortable


not eating well many times





 Vital Signs











Temp  97.4 F L  04/22/19 06:31


 


Pulse  76   04/22/19 06:31


 


Resp  20   04/22/19 06:31


 


BP  96/54 L  04/22/19 06:31


 


Pulse Ox  97   04/21/19 20:04








 Intake & Output











 04/21/19 04/21/19 04/22/19





 11:59 23:59 11:59


 


Intake Total  270 


 


Balance  270 


 


Weight 136 lb 12.8 oz  133 lb 6 oz


 


Intake:   


 


  IV  10 


 


    SALINE LOCK  10 


 


  IVPB  100 


 


  Oral  160 


 


Other:   


 


  Voiding Method Diaper Incontinent 


 


  # Unmeasured Voids   


 


    Void  0 


 


  Weight Measurement Method Patient Lift Scale  [facility]








Active Medications





Heparin Sodium (Porcine) (Heparin -)  5,000 unit SQ BID [facility]


   Last Admin: 04/21/19 22:17 Dose:  5,000 unit


Levofloxacin (Levaquin 500 Mg Premixed Ivpb -)  500 mg in 100 mls @ 100 mls/hr 

IVPB DAILY [facility]; Protocol


   Last Admin: 04/21/19 10:32 Dose:  100 mls/hr


Non-Formulary Medication (Carbidopa/Levodopa [Rytary Er 36.25 Mg-145 Mg Cap])  

1 each PO QID [facility]


   Last Admin: 04/21/19 22:16 Dose:  1 each


Non-Formulary Medication (Rotigotine [Neupro])  1 each TD DAILY [facility]


Quetiapine Fumarate (Seroquel -)  25 mg PO TID [facility]


   Last Admin: 04/22/19 05:52 Dose:  25 mg


Senna/Docusate Sodium (Pericolace -)  1 tablet PO DAILY [facility]


   Last Admin: 04/21/19 10:15 Dose:  1 tablet





 CBC, BMP





 04/21/19 05:30 





 04/21/19 05:30 





 Microbiology











 04/18/19 15:27 Blood Culture - Preliminary





 Blood - Peripheral Venous    NO GROWTH OBTAINED AFTER 72 HOURS, INCUBATION TO 

CONTINUE





    FOR 2 DAYS.


 


 04/18/19 15:27 Blood Culture - Preliminary





 Blood - Peripheral Venous    NO GROWTH OBTAINED AFTER 72 HOURS, INCUBATION TO 

CONTINUE





    FOR 2 DAYS.


 


 04/18/19 16:10 Urine Culture - Final





 Urine - Urine Clean Catch    Klebsiella Pneumoniae - Esbl














Physical Exam


S1 S2 RRR


Lungs - diminished at bases 


abd- soft


ext- no edema


neuro- awake


sacral decubitus





PLAN





stable


continue present care 


abx


will follow


d/c tele


pt is dnr/di


f/u cultures


swallow eval


ESbl precautions


abx


swallowing study-- Discussed with Sarita Verduzco


peg recommended


d/c Enalapril== bp on low side


will consult gi for peg as well as Palliative care














Problem List





- Problems


(1) Sacral decubitus ulcer, stage IV


Code(s): L89.154 - PRESSURE ULCER OF SACRAL REGION, STAGE 4   





(2) Cough


Code(s): R05 - COUGH   





(3) SOB (shortness of breath)


Code(s): R06.02 - SHORTNESS OF BREATH   





(4) Parkinson's disease


Code(s): G20 - PARKINSON'S DISEASE

## 2019-04-22 NOTE — CON.GI
Consult


Consult Specialty:: Gastroenterology


Referred by:: Dr. Eliecer Camarillo


Reason for Consultation:: PEG insertion





- History of Present Illness


Chief Complaint: Patient communicates minimally


History of Present Illness: 





74F admitted for suspected aspiration pneumonia. The history is obtained from 

her  Jonathan who is at her bedside and who is her primary caretaker. He 

tells me that Bridgett has OMS and rapidly progressing Parkinson's Disease since 

she is not able to swallow her new Parkinson medication wholly consistently. 

She is bedridden. He is a disabled . He has had to feed his wife slowly 

and mainly with soft foods as she chews minimally. He does not think that she 

has ever had an EGD or a colonoscopy. 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 Haven Verduzco did a swallowing evaluation earlier and has 

found Bridgett to be at risk for aspiration. A pureed dysphagia diet has been 

initiated. Bridgett is a DNR.  She has a sacral decubitus





- History Source


History Provided By: Family Member


Limitations to Obtaining History: Dementia





- Past Medical History


CNS: Yes: Dementia, Parkinson's


...Pregnant: No





- Past Surgical History


Past Surgical History: Yes: Hysterectomy (TAHBSO for fibroids), Tonsillectomy





- Alcohol/Substance Use


Hx Alcohol Use: No


History of Substance Use: reports: None





- Smoking History


Smoking history: Never smoked


Have you smoked in the past 12 months: No


Aproximately how many cigarettes per day: 0





- Social History


Usual Living Arrangement: With Spouse


ADL: Family Assistance


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


Place of Birth: [facility]


History of Recent Travel: No





Home Medications





- Allergies


Allergies/Adverse Reactions: 


 Allergies











Allergy/AdvReac Type Severity Reaction Status Date / Time


 


codeine Allergy Intermediate Itching Verified 04/18/19 14:51


 


Penicillins Allergy Intermediate Rash Verified 04/18/19 14:51














- Home Medications


Home Medications: 


Ambulatory Orders





Carbidopa/Levodopa [Rytary ER 36.25 mg-145 mg Cap] 1 each PO QID 05/16/16 


Rotigotine [Neupro] 1 each TD DAILY 05/16/16 


Sennosides/Docusate Sodium [Stool Softener-Laxative Tab] 1 each PO DAILY 05/16/ 16 


Cefuroxime Axetil [Ceftin -] 500 mg PO BID 04/03/19 


Quetiapine Fumarate [Seroquel -] 25 mg PO TID 04/18/19 











Family Disease History





- Family Disease History


Family Disease History: CA: Father (brain cancer), Mother (leukemia)





Review of Systems


Unable to obtain ROS, reason: dementia





Physical Exam-GI


Vital Signs: 


 Vital Signs











Temperature  97.8 F   04/22/19 09:00


 


Pulse Rate  59 L  04/22/19 09:00


 


Respiratory Rate  20   04/22/19 09:00


 


Blood Pressure  130/70   04/22/19 09:00


 


O2 Sat by Pulse Oximetry (%)  97   04/22/19 10:00








CBC,CMP











WBC  7.3 K/mm3 (4.0-10.0)   04/21/19  05:30    


 


RBC  3.62 M/mm3 (3.60-5.2)   04/21/19  05:30    


 


Hgb  11.2 GM/dL (10.7-15.3)   04/21/19  05:30    


 


Hct  33.3 % (32.4-45.2)   04/21/19  05:30    


 


MCV  91.9 fl (80-96)   04/21/19  05:30    


 


MCH  30.9 pg (25.7-33.7)   04/21/19  05:30    


 


MCHC  33.6 g/dl (32.0-36.0)   04/21/19  05:30    


 


RDW  13.7 % (11.6-15.6)   04/21/19  05:30    


 


Plt Count  358 K/MM3 (134-434)   04/21/19  05:30    


 


MPV  6.6 fl (7.5-11.1)  L  04/21/19  05:30    


 


Absolute Neuts (auto)  5.6 K/mm3 (1.5-8.0)   04/21/19  05:30    


 


Neutrophils %  77.0 % (42.8-82.8)   04/21/19  05:30    


 


Lymphocytes %  10.6 % (8-40)   04/21/19  05:30    


 


Monocytes %  12.2 % (3.8-10.2)  H D 04/21/19  05:30    


 


Eosinophils %  0.1 % (0-4.5)  D 04/21/19  05:30    


 


Basophils %  0.1 % (0-2.0)   04/21/19  05:30    


 


Nucleated RBC %  0 % (0-0)   04/21/19  05:30    


 


Sodium  140 mmol/L (136-145)   04/21/19  05:30    


 


Potassium  4.0 mmol/L (3.5-5.1)   04/21/19  05:30    


 


Chloride  104 mmol/L ()   04/21/19  05:30    


 


Carbon Dioxide  29 mmol/L (21-32)   04/21/19  05:30    


 


Anion Gap  7 MMOL/L (8-16)  L  04/21/19  05:30    


 


BUN  26 mg/dL (7-18)  H  04/21/19  05:30    


 


Creatinine  0.7 mg/dL (0.55-1.3)   04/21/19  05:30    


 


Creat Clearance w eGFR  81.36  (>60)   04/21/19  05:30    


 


POC Glucometer  91 UNITS ()   04/21/19  06:31    


 


Random Glucose  93 mg/dL ()   04/21/19  05:30    


 


Lactic Acid  1.1 mmol/L (0.4-2.0)   04/18/19  15:20    


 


Calcium  8.9 mg/dL (8.5-10.1)   04/21/19  05:30    


 


Total Bilirubin  0.3 mg/dL (0.2-1)   04/21/19  05:30    


 


AST  7 U/L (15-37)  L  04/21/19  05:30    


 


ALT  < 6 U/L (13-61)  L  04/21/19  05:30    


 


Alkaline Phosphatase  71 U/L ()   04/21/19  05:30    


 


Troponin I  < 0.02 ng/ml (0.00-0.05)   04/18/19  15:27    


 


B-Natriuretic Peptide  1036.6 pg/ml (5-450)  H  04/18/19  15:27    


 


Total Protein  6.0 g/dl (6.4-8.2)  L  04/21/19  05:30    


 


Albumin  2.5 g/dl (3.4-5.0)  L  04/21/19  05:30    








 Current Medications











Generic Name Dose Route Start Last Admin





  Trade Name Freq  PRN Reason Stop Dose Admin


 


Albuterol/Ipratropium  1 amp  04/22/19 14:00  





  Duoneb -  NEB   





  RTID IMELDA   





     





     





     





     


 


Heparin Sodium (Porcine)  5,000 unit  04/19/19 22:00  04/22/19 09:56





  Heparin -  SQ   5,000 unit





  BID IMELDA   Administration





     





     





     





     


 


Levofloxacin  500 mg in 100 mls @ 100 mls/hr  04/20/19 11:15  04/22/19 09:55





  Levaquin 500 Mg Premixed Ivpb -  IVPB   100 mls/hr





  DAILY IMELDA   Administration





     





     





  Protocol   





     


 


Non-Formulary Medication  1 each  04/18/19 22:00  04/22/19 09:55





  Carbidopa/Levodopa [Rytary Er 36.25 Mg-145 Mg Cap]  PO   1 each





  QID IMELDA   Administration





     





     





     





     


 


Non-Formulary Medication  1 each  04/19/19 10:00  





  Rotigotine [Neupro]  TD   





  DAILY IMELDA   





     





     





     





     


 


Quetiapine Fumarate  25 mg  04/18/19 22:00  04/22/19 05:52





  Seroquel -  PO   25 mg





  TID IMELDA   Administration





     





     





     





     


 


Senna/Docusate Sodium  1 tablet  04/19/19 10:00  04/22/19 09:55





  Pericolace -  PO   1 tablet





  DAILY IMELDA   Administration





     





     





     





     











Constitutional: Yes: No Distress


Eyes: Yes: Conjunctiva Clear


HENT: Yes: Atraumatic


Neck: Yes: Supple


Cardiovascular: Yes: Regular Rate and Rhythm


Respiratory: Yes: Rhonchi (at right base)


Gastrointestinal Inspection: Yes: Scars (healed Pfannensteil incision)


...Auscultate: Yes: Normoactive Bowel Sounds


...Palpate: Yes: Soft, Other (nontender)


...Rectal Exam: Yes: Deferred


Labs: 


 CBC, BMP





 04/21/19 05:30 





 04/21/19 05:30 





 INR, PTT











INR  1.28  (0.83-1.09)  H  04/18/19  15:20    














Problem List





- Problems


(1) Parkinson's disease


Assessment/Plan: 


Bridgett's Parkinson's Disease and dementia appears to have reached the point 

where she cannot adequately chew and masticate her food or swallow it safely. A 

G tube would enhance her nutritional input. I have discussed several options 

with her  Jonathan that include an endoscopic or surgically placed G tube 

as well as the option of having it placed by IR. After we discussed the need 

for anesthesia for an endoscopic PEG, Jonathan has opted for an IR inserted G 

tube. He understands that this will require a temporary NG tube insertion.  


Code(s): G20 - PARKINSON'S DISEASE   





(2) Adult failure to thrive


Code(s): R62.7 - ADULT FAILURE TO THRIVE   





(3) Dementia


Code(s): F03.90 - UNSPECIFIED DEMENTIA WITHOUT BEHAVIORAL DISTURBANCE   





(4) Aspiration into lower respiratory tract


Code(s): T17.800A - UNSP FOREIGN BODY IN OTH PRT RESP TRACT CAUSING ASPHYX, 

INIT   





(5) Sacral decubitus ulcer, stage IV


Code(s): L89.154 - PRESSURE ULCER OF SACRAL REGION, STAGE 4   





Assessment/Plan





Impression:


I agree that Bridgett can nutritionally benefit from a G tube. I did explain to 

Jonathan that this will not allow passage of a solid tablet, that it requires that 

the medication be crushed and that not all medications can be crushed. He does 

not want to subject his wife to anesthesia given her DNR  status. 





Plan: 


Please consult IR for G tube insertion if/when deemed appropriate


Pureed dysphagia diet trial initiated. 


Please recall us as needed.

## 2019-04-22 NOTE — CONSULT
Admitting History and Physical





- Primary Care Physician


PCP: Eliecer Camarillo





- Admission


History of Present Illness: 





74 year old woman history decubitus sacral ulcer, advanced Parkinson's disease ,

predominantly bed bound, admitted with cough and wheeze for 2 days with dx of 

Pneumonia likely aspiration per Pulmonary.


Pt's  reports hospitalization and extensive rehab placement after "Sinus 

infection went to her brain".





 Selected Entries











  04/19/19 04/19/19 04/20/19





  11:13 20:01 01:00


 


Breakfast   


 


Diet Tolerated Well Well 


 


Supper  100% 


 


Temperature   99.0 F














  04/20/19 04/20/19 04/20/19





  05:00 09:00 10:00


 


Breakfast   25%


 


Diet Tolerated   Poor


 


Supper   


 


Temperature 98.7 F 97.8 F 














  04/20/19 04/20/19 04/20/19





  14:00 20:07 21:00


 


Breakfast   


 


Diet Tolerated  Poor 


 


Supper  25% 


 


Temperature 98.7 F  98.4 F














  04/21/19 04/21/19 04/21/19





  01:00 05:00 09:00


 


Breakfast   


 


Diet Tolerated   


 


Supper   


 


Temperature 97.8 F 98.2 F 98.4 F














  04/21/19 04/21/19 04/21/19





  12:01 18:00 18:35


 


Breakfast 0  


 


Diet Tolerated Poor  


 


Supper   0


 


Temperature  98.3 F 














  04/21/19 04/22/19 04/22/19





  20:04 02:10 06:31


 


Breakfast   


 


Diet Tolerated   


 


Supper   


 


Temperature 98.4 F 97.9 F 97.4 F L








Pt on reg diet/thin liquids.


Discharge home planned for today.





This is my first consult with this pt.


History Source: Family Member, Medical Record


Limitations to Obtaining History: Clinical Condition, Dementia, Poor Historian





- Past Medical History


CNS: Yes: Dementia, Parkinson's


Cardiovascular: No: AFIB


Pulmonary: No: O2 Dependent


Gastrointestinal: No: Ascites


Hepatobiliary: No: Cirrhosis


Renal/: No: Renal Failure


...Pregnant: No


Heme/Onc: Yes: Anemia


Psych: No: Addictions





- Smoking History


Smoking history: Never smoked


Have you smoked in the past 12 months: No


Aproximately how many cigarettes per day: 0





- Alcohol/Substance Use


Hx Alcohol Use: No





History





- Admission


Reason For Visit: ACUTE ON CHRONIC CONGESTIVE HEART FAILURE





- Diagnostics


X-ray: Report Reviewed





- General


Mental Status: Forgetful, Vague, Confused, Flat Affect


Attention: Moderate Impairment


Ability to Follow Directions: Poor


Head/Neck Control: Fair





- Hearing


Hearing: Functional


Hearing: Normal


Hearing Aide: No


With Patient: No





Speech Evaluation





- Communication


Primary Language: ENGLISH


Communication: Yes: Simple Responses (rare verbalizations - just her name "Mackenzie" 

but did not answer any other questions.)


Oral Expression Ability: Yes: Severe Impairment





- Speech Production


Intelligibility: Yes: Moderately Impaired





- Speech Characteristics


Voice Loudness: Mildly Soft/Quiet, Moderately Soft/Quiet


Voice Pitch: Yes: Normal


Voice Phonatory-based Quality: Yes: Normal


Speech Clarity: < 75%


Nasal Resonance: Normal


Articulation: Yes: Precise





- Language/Auditory Comprehension


Observation: Comprehends Conversational Speech: No (social speech, possibly 

simple questions)





- Language/Verbal Expression


Able to Respond to Simple Queries: Yes: Moderately Impaired, Severely Impaired


Able to Communicate Wants and Needs: Yes: Moderately Impaired, Severely Impaired


Functional Communication Status: Yes: Moderately Impaired, Severely Impaired





- Swallow Evaluation/Bedside Assessment


Current Nutritional Intake: Regular, Thin Liquids


Oral Secretions: Yes: Pooling


Dentition: Yes: Adequate


Facial Symmetry at Rest: Symmetrical


Facial Symmetry on Retraction: Symmetrical


Lingual Movement: Symmetric


Lingual Speed of Movement: Reduced


Lingual Movement Strgth Against Opposition: Reduced


Lingual Movement Characteristics: Normal


Laryngeal Movement: Reduced Excursion, Labored,delay initiation


Rate of Intake: Slow/Holding


Timing of Swallow: Delayed


Coughing/Throat Clear: Yes (delayed cough with thin water)





Recommendations





- Speech Evaluation, Impression/Plan


Impression: High risk of malnutrition, dehydration with oral holding, delayed 

swallow, cough on thin liquid, c/w aspiration. It took pt 25 min to take small 

Orange juice today. Difficulty taking medication at home.





- Dysphagia Impressions/Plan


Swallowing Skills: Impaired


Dysphagia Impressions: Moderate Impairment, Ongoing Evaluation


*Silent aspiration: cannot be R/O at bedside


Dysphagia Treatment Plan: Small Bites, Chin Tuck/Down, Clear Pocket Food, 

Facilitative Feeding, Safe Rate, 1/2 tsp. at a time, Elevate HOB during feed


Recommendations: Palliative Care (Discussed with nursing/ consideration 

for PEG insertion for improved consistency of nutrition, hydration,medication, 

with pleasure feedings, if tolerated.)





- Recommendations


Diet Consistency: Dysphagia Pureed


Medication Administration: Crushed with applesauce


Liquids: Nectar Thick (on tsp)


Supplement: Magic Cup, Ensure Pudding, Other (2 yael HN)

## 2019-04-23 RX ADMIN — QUETIAPINE FUMARATE SCH MG: 25 TABLET ORAL at 14:47

## 2019-04-23 RX ADMIN — Medication SCH EACH: at 14:47

## 2019-04-23 RX ADMIN — QUETIAPINE FUMARATE SCH MG: 25 TABLET ORAL at 06:19

## 2019-04-23 RX ADMIN — IPRATROPIUM BROMIDE AND ALBUTEROL SULFATE SCH AMP: .5; 3 SOLUTION RESPIRATORY (INHALATION) at 20:36

## 2019-04-23 RX ADMIN — DOCUSATE SODIUM,SENNOSIDES SCH TABLET: 50; 8.6 TABLET, FILM COATED ORAL at 10:57

## 2019-04-23 RX ADMIN — MINERAL OIL PRN ML: 1000 SOLUTION ORAL at 21:05

## 2019-04-23 RX ADMIN — QUETIAPINE FUMARATE SCH MG: 25 TABLET ORAL at 21:00

## 2019-04-23 RX ADMIN — Medication SCH EACH: at 21:00

## 2019-04-23 RX ADMIN — Medication SCH EACH: at 10:56

## 2019-04-23 RX ADMIN — IPRATROPIUM BROMIDE AND ALBUTEROL SULFATE SCH AMP: .5; 3 SOLUTION RESPIRATORY (INHALATION) at 07:25

## 2019-04-23 RX ADMIN — IPRATROPIUM BROMIDE AND ALBUTEROL SULFATE SCH AMP: .5; 3 SOLUTION RESPIRATORY (INHALATION) at 13:47

## 2019-04-23 RX ADMIN — Medication SCH EACH: at 17:37

## 2019-04-23 RX ADMIN — HEPARIN SODIUM SCH UNIT: 5000 INJECTION, SOLUTION INTRAVENOUS; SUBCUTANEOUS at 10:56

## 2019-04-23 RX ADMIN — HEPARIN SODIUM SCH UNIT: 5000 INJECTION, SOLUTION INTRAVENOUS; SUBCUTANEOUS at 21:00

## 2019-04-23 NOTE — PN
Progress Note, SLP





- Note


Progress Note: 





 Selected Entries











  04/22/19 04/22/19 04/22/19





  02:10 06:31 09:00


 


Breakfast   


 


Lunch   


 


Supper   


 


Temperature 97.9 F 97.4 F L 97.8 F














  04/22/19 04/22/19 04/22/19





  16:18 17:35 19:51


 


Breakfast 50%  


 


Lunch 75%  


 


Supper   50%


 


Temperature 98.6 F 98.6 F 














  04/22/19 04/23/19





  20:38 06:50


 


Breakfast  


 


Lunch  


 


Supper  


 


Temperature 97.9 F 98.8 F








 Laboratory Tests











  04/21/19





  05:30


 


WBC  7.3








Pt now on Dys puree/nectar thick liquid.


Appreciate GI consult.


Pending consideration for PEG by IR 





Pt's  repeated several times his biggest concern that the DNR/DNI stay 

in affect when PEG is placed. Call placed to Palliative care who will review 

his wishes. Discussed with Palliative care nurse-Pt's  reconsidering his 

decision regarding TF based on pt's wishes.


Pt still with cough after nectar thick liquid.


Reviewed with nursing/RD





REC: 


Downgrade to honey thick liquid


D/C 2 yael HN.(which is nectar thick)

## 2019-04-23 NOTE — PN
Progress Note (short form)





- Note


Progress Note: 





PULMONARY





Pt nonverbal,  at bedside considering PEG placement. No fevers recorded.





 Vital Signs











 Period  Temp  Pulse  Resp  BP Sys/Lundberg  Pulse Ox


 


 Last 24 Hr  97.9 F-98.8 F  53-78  20-22  /50-68  97-98














Gen:  NAD at rest


Heart:  RRR


Lung: scattered rhonchi


Abd: soft, nontender


Ext: no edema


 CBC, BMP





 04/21/19 05:30 





 04/21/19 05:30 





Active Medications





Albuterol/Ipratropium (Duoneb -)  1 amp NEB RTID [facility]


   Last Admin: 04/23/19 07:25 Dose:  1 amp


Heparin Sodium (Porcine) (Heparin -)  5,000 unit SQ BID [facility]


   Last Admin: 04/22/19 22:14 Dose:  5,000 unit


Levofloxacin (Levaquin 500 Mg Premixed Ivpb -)  500 mg in 100 mls @ 100 mls/hr 

IVPB DAILY [facility]; Protocol


   Last Admin: 04/22/19 09:55 Dose:  100 mls/hr


Non-Formulary Medication (Carbidopa/Levodopa [Rytary Er 36.25 Mg-145 Mg Cap])  

1 each PO QID [facility]


   Last Admin: 04/22/19 22:22 Dose:  1 each


Non-Formulary Medication (Rotigotine [Neupro])  1 each TD DAILY [facility]


Quetiapine Fumarate (Seroquel -)  25 mg PO TID [facility]


   Last Admin: 04/23/19 06:19 Dose:  25 mg


Senna/Docusate Sodium (Pericolace -)  1 tablet PO DAILY [facility]


   Last Admin: 04/22/19 09:55 Dose:  1 tablet











A/P


Pneumonia likely aspiration


Parkinsons 


Dementia


Sacral Decubitus Ulcer





-  continue antibiotics


-  inhaled bronchodilators


-  O2 to keep SpO2 >90%


-  aspiration precautions


-  DVT prophylaxis

## 2019-04-23 NOTE — PN
Progress Note (short form)





- Note


Progress Note: 


Events noted


Spoke to  


does not want pt to get peg tube


she ate pureed 


 Vital Signs - 24 hr











  04/22/19 04/22/19 04/22/19





  16:18 17:35 19:10


 


Temperature 98.6 F 98.6 F 


 


Pulse Rate 78 53 L 59 L


 


Respiratory 22 H 20 





Rate   


 


Blood Pressure 120/68 80/50 L 100/60


 


O2 Sat by Pulse   





Oximetry (%)   














  04/22/19 04/22/19 04/23/19





  20:38 22:00 06:50


 


Temperature 97.9 F  98.8 F


 


Pulse Rate 60  68


 


Respiratory   20





Rate   


 


Blood Pressure 100/58 L  104/56 L


 


O2 Sat by Pulse  98 





Oximetry (%)   








 Current Medications











Generic Name Dose Route Start Last Admin





  Trade Name Freq  PRN Reason Stop Dose Admin


 


Albuterol/Ipratropium  1 amp  04/22/19 14:00  04/23/19 07:25





  Duoneb -  NEB   1 amp





  RTID IMELDA   Administration





     





     





     





     


 


Heparin Sodium (Porcine)  5,000 unit  04/19/19 22:00  04/23/19 10:56





  Heparin -  SQ   5,000 unit





  BID IMELDA   Administration





     





     





     





     


 


Levofloxacin  500 mg in 100 mls @ 100 mls/hr  04/20/19 11:15  04/23/19 10:56





  Levaquin 500 Mg Premixed Ivpb -  IVPB   100 mls/hr





  DAILY IMELDA   Administration





     





     





  Protocol   





     


 


Non-Formulary Medication  1 each  04/18/19 22:00  04/23/19 10:56





  Carbidopa/Levodopa [Rytary Er 36.25 Mg-145 Mg Cap]  PO   1 each





  QID IMELDA   Administration





     





     





     





     


 


Non-Formulary Medication  1 each  04/19/19 10:00  





  Rotigotine [Neupro]  TD   





  DAILY IMELDA   





     





     





     





     


 


Quetiapine Fumarate  25 mg  04/18/19 22:00  04/23/19 06:19





  Seroquel -  PO   25 mg





  TID IMELDA   Administration





     





     





     





     


 


Senna/Docusate Sodium  1 tablet  04/19/19 10:00  04/23/19 10:57





  Pericolace -  PO   1 tablet





  DAILY IMELDA   Administration





     





     





     





     











Physical Exam


S1 S2 RRR


Lungs - diminished at bases 


abd- soft


ext- no edema


neuro- awake


sacral decubitus





PLAN





stable


continue present care 


abx


will follow


f/u cultures


swallow eval


ESbl precautions


abx


?palliative care , considering hospice 








Problem List





- Problems


(1) Adult failure to thrive


Code(s): R62.7 - ADULT FAILURE TO THRIVE   





(2) CHF exacerbation


Code(s): I50.9 - HEART FAILURE, UNSPECIFIED   


Qualifiers: 


   Heart failure type: unspecified   Qualified Code(s): I50.9 - Heart failure, 

unspecified   





(3) Sacral decubitus ulcer, stage IV


Code(s): L89.154 - PRESSURE ULCER OF SACRAL REGION, STAGE 4

## 2019-04-24 RX ADMIN — Medication SCH EACH: at 10:48

## 2019-04-24 RX ADMIN — IPRATROPIUM BROMIDE AND ALBUTEROL SULFATE SCH AMP: .5; 3 SOLUTION RESPIRATORY (INHALATION) at 20:37

## 2019-04-24 RX ADMIN — QUETIAPINE FUMARATE SCH MG: 25 TABLET ORAL at 15:36

## 2019-04-24 RX ADMIN — QUETIAPINE FUMARATE SCH MG: 25 TABLET ORAL at 22:31

## 2019-04-24 RX ADMIN — HEPARIN SODIUM SCH UNIT: 5000 INJECTION, SOLUTION INTRAVENOUS; SUBCUTANEOUS at 10:48

## 2019-04-24 RX ADMIN — IPRATROPIUM BROMIDE AND ALBUTEROL SULFATE SCH AMP: .5; 3 SOLUTION RESPIRATORY (INHALATION) at 07:39

## 2019-04-24 RX ADMIN — HEPARIN SODIUM SCH UNIT: 5000 INJECTION, SOLUTION INTRAVENOUS; SUBCUTANEOUS at 22:30

## 2019-04-24 RX ADMIN — IPRATROPIUM BROMIDE AND ALBUTEROL SULFATE SCH: .5; 3 SOLUTION RESPIRATORY (INHALATION) at 14:30

## 2019-04-24 RX ADMIN — Medication SCH EACH: at 15:37

## 2019-04-24 RX ADMIN — Medication SCH EACH: at 22:31

## 2019-04-24 RX ADMIN — QUETIAPINE FUMARATE SCH MG: 25 TABLET ORAL at 05:43

## 2019-04-24 RX ADMIN — Medication SCH EACH: at 17:17

## 2019-04-24 RX ADMIN — DOCUSATE SODIUM,SENNOSIDES SCH TABLET: 50; 8.6 TABLET, FILM COATED ORAL at 10:48

## 2019-04-24 NOTE — DS
Physical Examination


Vital Signs: 


 Vital Signs











Temperature  97.4 F L  04/24/19 06:45


 


Pulse Rate  59 L  04/24/19 06:45


 


Respiratory Rate  20   04/24/19 06:45


 


Blood Pressure  108/60   04/24/19 06:45


 


O2 Sat by Pulse Oximetry (%)  93 L  04/23/19 22:00











Constitutional: Yes: No Distress


Cardiovascular: Yes: Regular Rate and Rhythm


Respiratory: Yes: Diminished


Gastrointestinal: Yes: Normal Bowel Sounds, Soft.  No: Tenderness, Rebound


Edema: No


Labs: 


 CBC, BMP





 04/21/19 05:30 





 04/21/19 05:30 











Discharge Summary


Reason For Visit: ACUTE ON CHRONIC CONGESTIVE HEART FAILURE


Current Active Problems





Adult failure to thrive (Acute)


Aspiration into lower respiratory tract (Acute)


CHF exacerbation (Acute)


Cough (Acute)


Dementia (Acute)


Hypoxia (Acute)


SOB (shortness of breath) (Acute)


Sacral decubitus ulcer, stage IV (Acute)








Hospital Course: 





Pt admitted for pneumonia, sent by PMD 


Was started on IV Levaquin


nebs


CT chest noted


Evaluated by GI, Neurology, swallow eval 


family does not wish to have peg tube


Pt eating about 50-75% of meals 





will dc pt home on PO Levaquin x 4 days


Will need home care 





Condition: Stable





- Instructions


Disposition: HOME





- Home Medications


Comprehensive Discharge Medication List: 


Ambulatory Orders





Rotigotine [Neupro] 1 each TD DAILY 05/16/16 


Sennosides/Docusate Sodium [Stool Softener-Laxative Tab] 1 each PO DAILY 05/16/ 16 


Cefuroxime Axetil [Ceftin -] 500 mg PO BID 04/03/19 


Quetiapine Fumarate [Seroquel -] 25 mg PO TID 04/18/19 


Carbidopa/Levodopa [Rytary ER 23.75 mg-95 mg Cap] 2 each PO QID 04/23/19

## 2019-04-24 NOTE — PN
Progress Note (short form)





- Note


Progress Note: 


NEUROLOGY PROGRESS:





Events reviewed and discussed with MONICA Cohen. Case discussed with  

Antony and two sons at bedside. 


Previous to urosepsis,  notes wife was able to eat chopped foods/regular 

liquids with assistance and maintaining current weight. 


HHS 12H/day/7days.


He notes with assistance of aide, his wife was able to ambulate and make needs 

known including use of bathroom, now limited by sacral ulcer. 


Now diet downgraded to puree, nectar thick with discussion of PEG placement, 

which  notes is not part of his wife's wishes. 


Remains on IV levofloxacin for + ESBL in urine and presumed aspiration 

pneumonia. 





YUNG: BPs 100s/60s. Wound vac in situ. Wearing diaper. Early contractures of R 

hand, L elbow, L knee. 


NEURO: Awake, alert, responsive. hypophonic speech. Ox to self. Knows husbands 

name. Follows simple commands. + glabella, grasps  


           Intermittent rhythmic rest tremor both hands with jaw tremor. + 

cogwheeling. Reflexes symmetric, except absent AJ's. 


           Feels pinch in all fours. 





Impression: B/L Cerebral Dysfunction (OMS, chronic) 


                Toxic-Metabolic Encephalopathy (urosepsis, sacral wound, ? 

aspiration pna) 


                Parkinson's disease


                Parkinson's disease psychosis 





Suggest: Aggressive fluid hydration and antibiotics 


             Orthostatic BP's


             Advise crushed meds in applesauce including opening of Rytary 

capsule, assist with feeds 


             Bedside PT for mobilization vs. contractures and mobilize patient 

OOB to chair during meals 


              to assist in providing 24/7 HHS for patient 





Thank you very much, 


Rene Duran MD

## 2019-04-25 RX ADMIN — IPRATROPIUM BROMIDE AND ALBUTEROL SULFATE SCH AMP: .5; 3 SOLUTION RESPIRATORY (INHALATION) at 20:20

## 2019-04-25 RX ADMIN — QUETIAPINE FUMARATE SCH MG: 25 TABLET ORAL at 15:58

## 2019-04-25 RX ADMIN — MINERAL OIL PRN ML: 1000 SOLUTION ORAL at 09:48

## 2019-04-25 RX ADMIN — Medication SCH EACH: at 09:48

## 2019-04-25 RX ADMIN — Medication SCH EACH: at 21:46

## 2019-04-25 RX ADMIN — HEPARIN SODIUM SCH UNIT: 5000 INJECTION, SOLUTION INTRAVENOUS; SUBCUTANEOUS at 10:02

## 2019-04-25 RX ADMIN — Medication SCH EACH: at 15:57

## 2019-04-25 RX ADMIN — QUETIAPINE FUMARATE SCH MG: 25 TABLET ORAL at 21:45

## 2019-04-25 RX ADMIN — IPRATROPIUM BROMIDE AND ALBUTEROL SULFATE SCH AMP: .5; 3 SOLUTION RESPIRATORY (INHALATION) at 07:43

## 2019-04-25 RX ADMIN — QUETIAPINE FUMARATE SCH MG: 25 TABLET ORAL at 05:46

## 2019-04-25 RX ADMIN — HEPARIN SODIUM SCH UNIT: 5000 INJECTION, SOLUTION INTRAVENOUS; SUBCUTANEOUS at 21:45

## 2019-04-25 RX ADMIN — IPRATROPIUM BROMIDE AND ALBUTEROL SULFATE SCH AMP: .5; 3 SOLUTION RESPIRATORY (INHALATION) at 15:44

## 2019-04-25 RX ADMIN — DOCUSATE SODIUM,SENNOSIDES SCH TABLET: 50; 8.6 TABLET, FILM COATED ORAL at 10:02

## 2019-04-25 RX ADMIN — Medication SCH EACH: at 18:38

## 2019-04-25 NOTE — PN
Progress Note (short form)





- Note


Progress Note: 


Events noted


Spoke to  


does not want pt to get peg tube


she ate pureed diet - no difficulty 





 Vital Signs - 24 hr











  04/24/19 04/24/19 04/25/19





  17:14 21:00 06:45


 


Temperature 98.4 F 98 F 99.6 F


 


Pulse Rate 70 78 64


 


Respiratory 20 18 20





Rate   


 


Blood Pressure 115/69 120/65 106/64








 Current Medications











Generic Name Dose Route Start Last Admin





  Trade Name Freq  PRN Reason Stop Dose Admin


 


Albuterol/Ipratropium  1 amp  04/22/19 14:00  04/25/19 07:43





  Duoneb -  NEB   1 amp





  RTID IMELDA   Administration





     





     





     





     


 


Heparin Sodium (Porcine)  5,000 unit  04/19/19 22:00  04/25/19 10:02





  Heparin -  SQ   5,000 unit





  BID IMELDA   Administration





     





     





     





     


 


Levofloxacin  500 mg in 100 mls @ 100 mls/hr  04/20/19 11:15  04/25/19 09:49





  Levaquin 500 Mg Premixed Ivpb -  IVPB   100 mls/hr





  DAILY IMELDA   Administration





     





     





  Protocol   





     


 


Mineral Oil  30 ml  04/23/19 15:41  04/25/19 09:48





  Mineral Oil -  PO   30 ml





  Q8H PRN   Administration





  FOR DRY MOUTH   





     





     





     


 


Non-Formulary Medication  1 each  04/19/19 10:00  





  Rotigotine [Neupro]  TD   





  DAILY IMELDA   





     





     





     





     


 


Patient's Own  2 each  04/23/19 18:00  04/25/19 09:48





Medication (Non-  PO   2 each





Formulary) (  QID IMELDA   Administration





Carbidopa/Levodopa [     





Rytary Er 23.75 Mg-     





95 Mg Ca     





     


 


Quetiapine Fumarate  25 mg  04/18/19 22:00  04/25/19 05:46





  Seroquel -  PO   25 mg





  TID IMELDA   Administration





     





     





     





     


 


Senna/Docusate Sodium  1 tablet  04/19/19 10:00  04/25/19 10:02





  Pericolace -  PO   1 tablet





  DAILY IMELDA   Administration





     





     





     





     











Physical Exam


S1 S2 RRR


Lungs - diminished at bases 


abd- soft


ext- no edema


neuro- awake


sacral decubitus





PLAN





stable


continue present care 


abx-- > changed to PO 


will need HHA /VNS at home


  is not sure about hospice at home


dc plan today on PO antibiotics and encourage to drink fluids


wound care with wound vac 








Problem List





- Problems


(1) Adult failure to thrive


Code(s): R62.7 - ADULT FAILURE TO THRIVE   





(2) CHF exacerbation


Code(s): I50.9 - HEART FAILURE, UNSPECIFIED   


Qualifiers: 


   Heart failure type: unspecified   Qualified Code(s): I50.9 - Heart failure, 

unspecified   





(3) Sacral decubitus ulcer, stage IV


Code(s): L89.154 - PRESSURE ULCER OF SACRAL REGION, STAGE 4

## 2019-04-25 NOTE — PN
Progress Note, SLP





- Note


Progress Note: 





 Selected Entries











  04/24/19 04/24/19 04/24/19





  06:32 06:45 17:14


 


Supper   


 


Temperature 97.4 F L 97.4 F L 98.4 F














  04/24/19 04/24/19 04/25/19





  19:04 21:00 06:45


 


Supper 50%  


 


Temperature  98 F 99.6 F








 Laboratory Tests











  04/21/19





  05:30


 


WBC  7.3








Pt on puree/honey thick liquid with slow acceptance.


Pending d/c home.





Suggest-RD f/u regarding family education-diet order to continue upon d/c

## 2019-04-25 NOTE — PN
Progress Note (short form)





- Note


Progress Note: 





PULMONARY





Pt nonverbal, no fevers recorded.





 Vital Signs











 Period  Temp  Pulse  Resp  BP Sys/Lundberg  Pulse Ox


 


 Last 24 Hr  98 F-99.6 F  64-78  18-20  106-120/64-69  











Gen:  NAD at rest


Heart:  RRR


Lung: scattered rhonchi


Abd: soft, nontender


Ext: no edema





 CBC, BMP





 04/21/19 05:30 





 04/21/19 05:30 





Active Medications





Albuterol/Ipratropium (Duoneb -)  1 amp NEB RTID [facility]


   Last Admin: 04/25/19 07:43 Dose:  1 amp


Heparin Sodium (Porcine) (Heparin -)  5,000 unit SQ BID [facility]


   Last Admin: 04/25/19 10:02 Dose:  5,000 unit


Levofloxacin (Levaquin 500 Mg Premixed Ivpb -)  500 mg in 100 mls @ 100 mls/hr 

IVPB DAILY [facility]; Protocol


   Last Admin: 04/25/19 09:49 Dose:  100 mls/hr


Mineral Oil (Mineral Oil -)  30 ml PO Q8H PRN


   PRN Reason: FOR DRY MOUTH


   Last Admin: 04/25/19 09:48 Dose:  30 ml


Non-Formulary Medication (Rotigotine [Neupro])  1 each TD DAILY [facility]


Patient's Own Medication (Non- Formulary) ( Carbidopa/Levodopa [ Rytary Er 

23.75 Mg- 95 Mg Ca  2 each PO QID [facility]


   Last Admin: 04/25/19 09:48 Dose:  2 each


Quetiapine Fumarate (Seroquel -)  25 mg PO TID [facility]


   Last Admin: 04/25/19 05:46 Dose:  25 mg


Senna/Docusate Sodium (Pericolace -)  1 tablet PO DAILY [facility]


   Last Admin: 04/25/19 10:02 Dose:  1 tablet








A/P


Pneumonia likely aspiration


Parkinsons 


Dementia


Sacral Decubitus Ulcer





-  continue antibiotics


-  inhaled bronchodilators


-  O2 to keep SpO2 >90%


-  aspiration precautions


-  DVT prophylaxis


-  d/c planning

## 2019-04-26 RX ADMIN — QUETIAPINE FUMARATE SCH MG: 25 TABLET ORAL at 14:11

## 2019-04-26 RX ADMIN — QUETIAPINE FUMARATE SCH MG: 25 TABLET ORAL at 05:38

## 2019-04-26 RX ADMIN — IPRATROPIUM BROMIDE AND ALBUTEROL SULFATE SCH AMP: .5; 3 SOLUTION RESPIRATORY (INHALATION) at 14:50

## 2019-04-26 RX ADMIN — HEPARIN SODIUM SCH UNIT: 5000 INJECTION, SOLUTION INTRAVENOUS; SUBCUTANEOUS at 09:58

## 2019-04-26 RX ADMIN — Medication SCH EACH: at 17:56

## 2019-04-26 RX ADMIN — Medication SCH EACH: at 14:11

## 2019-04-26 RX ADMIN — IPRATROPIUM BROMIDE AND ALBUTEROL SULFATE SCH AMP: .5; 3 SOLUTION RESPIRATORY (INHALATION) at 20:59

## 2019-04-26 RX ADMIN — QUETIAPINE FUMARATE SCH MG: 25 TABLET ORAL at 22:18

## 2019-04-26 RX ADMIN — Medication SCH EACH: at 09:59

## 2019-04-26 RX ADMIN — Medication SCH EACH: at 22:18

## 2019-04-26 RX ADMIN — DOCUSATE SODIUM,SENNOSIDES SCH TABLET: 50; 8.6 TABLET, FILM COATED ORAL at 09:58

## 2019-04-26 RX ADMIN — IPRATROPIUM BROMIDE AND ALBUTEROL SULFATE SCH AMP: .5; 3 SOLUTION RESPIRATORY (INHALATION) at 07:35

## 2019-04-26 NOTE — PN
Progress Note (short form)





- Note


Progress Note: 





PULMONARY





Appears lethargic


Application for [facility] submitted by discharge planner








Constitutional: Yes: Anxious


Eyes: Yes: Conjunctiva Clear


HENT: Yes: Normocephalic


Neck: Yes: Trachea Midline


Cardiovascular: Yes: Regular Rate and Rhythm, S1, S2


Respiratory: Yes: Poor Air Entry, Rales, Rhonchi


Gastrointestinal: Yes: Normal Bowel Sounds, Soft


Musculoskeletal: Yes: Muscle Weakness


Edema: No


Neurological: Yes: Oriented, Pre-Existing Deficit





Labs/meds/notes/images reviewed








(1) Aspiration into lower respiratory tract


Code(s): T17.800A - UNSP FOREIGN BODY IN OTH PRT RESP TRACT CAUSING ASPHYX, 

INIT   





(2) Cough


Code(s): R05 - COUGH   





(3) Hypoxia


Code(s): R09.02 - HYPOXEMIA   





(4) SOB (shortness of breath)


Code(s): R06.02 - SHORTNESS OF BREATH   





(5) Sacral decubitus ulcer, stage IV


Code(s): L89.154 - PRESSURE ULCER OF SACRAL REGION, STAGE 4   





(6) Parkinson's disease


Code(s): G20 - PARKINSON'S DISEASE   





Assessment/Plan





GIVEN ADVANCED STAGE OF PARKINSONS AND UNDERLYING DEBILITATION CALVARY WOULD BE 

APPROPRIATE


PLEASE CALL AS NEEDED





EVELIO MCCLURE MD








Problem List





- Problems


(1) Aspiration into lower respiratory tract


Code(s): T17.800A - UNSP FOREIGN BODY IN OTH PRT RESP TRACT CAUSING ASPHYX, 

INIT   





(2) Cough


Code(s): R05 - COUGH   





(3) Hypoxia


Code(s): R09.02 - HYPOXEMIA   





(4) SOB (shortness of breath)


Code(s): R06.02 - SHORTNESS OF BREATH   





(5) Sacral decubitus ulcer, stage IV


Code(s): L89.154 - PRESSURE ULCER OF SACRAL REGION, STAGE 4   





(6) Parkinson's disease


Code(s): G20 - PARKINSON'S DISEASE

## 2019-04-26 NOTE — CONSULT
Consult - text type





- Consultation


Consultation Note: 








NEUROLOGY FOLLOW-UP:





Events reviewed and discussed with  and son at the bedside.


Pt is intermittently lethargic/sleeping. Ate lunch with less difficulty but 

 struggling with dinner.


Still coughing after feeding.





On Rytary 95x2= 180 q4hr @, 7, 11, 3 and 7


     Nuplazid 34 mg q AM ( notes decreased hallucinations)


     Quetiapine 25 mg TID


     Pt is NOT on Neupro patch.





EXAM: Lethargic but arousable.


          Can't give "hospital", month or year.


          Masked facies. Decreased gag


          Rigid with cogwheeling.





IMP: Advanced PD


       OMS


       Parkinson's disease Psychosis.





Suggest: Continue hydration, current PD regimen


             Add Carbidopa/levadopa 25/100 to each rytary dose, and observe for 

improved swallow.


             Feed patient only upright and OOB in chair if possiible 





Thank you very much,


Rene Duran MD

## 2019-04-26 NOTE — PN
Progress Note (short form)





- Note


Progress Note: 


pt seen/ examined


chart reviewed


awake/ comfortable





 Vital Signs











Temp  99.1 F   04/26/19 06:00


 


Pulse  69   04/26/19 06:00


 


Resp  20   04/26/19 06:00


 


BP  153/72   04/26/19 06:00


 


Pulse Ox  93 L  04/23/19 22:00








 Intake & Output











 04/25/19 04/25/19 04/26/19





 11:59 23:59 11:59


 


Intake Total 30  


 


Balance 30  


 


Weight 121 lb 9 oz  131 lb 6.4 oz


 


Intake:   


 


  Oral 30  


 


Other:   


 


  Voiding Method  Diaper 


 


  # Unmeasured Voids   


 


    Void 1 1 1


 


  Bowel Movement  Yes: 1 Yes


 


  # Bowel Movements   1


 


  Weight Measurement Method Built in Bedscale  Built in Bedscale








Active Medications





Albuterol/Ipratropium (Duoneb -)  1 amp NEB RTID [facility]


   Last Admin: 04/26/19 07:35 Dose:  1 amp


Heparin Sodium (Porcine) (Heparin -)  5,000 unit SQ BID [facility]


   Last Admin: 04/26/19 09:58 Dose:  5,000 unit


Levofloxacin (Levaquin 500 Mg Premixed Ivpb -)  500 mg in 100 mls @ 100 mls/hr 

IVPB DAILY [facility]; Protocol


   Last Admin: 04/26/19 09:59 Dose:  100 mls/hr


Mineral Oil (Mineral Oil -)  30 ml PO Q8H PRN


   PRN Reason: FOR DRY MOUTH


   Last Admin: 04/25/19 09:48 Dose:  30 ml


Non-Formulary Medication (Rotigotine [Neupro])  1 each TD DAILY [facility]


Patient's Own Medication (Non- Formulary) ( Carbidopa/Levodopa [ Rytary Er 

23.75 Mg- 95 Mg Ca  2 each PO QID [facility]


   Last Admin: 04/26/19 09:59 Dose:  2 each


Quetiapine Fumarate (Seroquel -)  25 mg PO TID [facility]


   Last Admin: 04/26/19 05:38 Dose:  25 mg


Senna/Docusate Sodium (Pericolace -)  1 tablet PO DAILY [facility]


   Last Admin: 04/26/19 09:58 Dose:  1 tablet





 CBC, BMP





 04/21/19 05:30 





 04/21/19 05:30 











Physical Exam


awake/ comfortable


S1 S2 RRR


Lungs - diminished at bases 


abd- soft


ext- no edema


neuro- awake


sacral decubitus





PLAN





stable


continue present care 


abx-- d/c after today dose


will need HHA /VNS at home-- need 24 hour care  vs Nursing home


Discussed with  also as well as pt son -- who is upset with d/c 

orders


I explained at this time she dont need further hospitalization 


He is requesting 24 hour care help -- which i agree 


family has filed appeal


will follow








Problem List





- Problems


(1) Sacral decubitus ulcer, stage IV


Code(s): L89.154 - PRESSURE ULCER OF SACRAL REGION, STAGE 4   





(2) Cough


Code(s): R05 - COUGH   





(3) SOB (shortness of breath)


Code(s): R06.02 - SHORTNESS OF BREATH   





(4) Parkinson's disease


Code(s): G20 - PARKINSON'S DISEASE

## 2019-04-27 RX ADMIN — Medication SCH EACH: at 21:31

## 2019-04-27 RX ADMIN — Medication SCH EACH: at 10:01

## 2019-04-27 RX ADMIN — CARBIDOPA AND LEVODOPA SCH EACH: 25; 100 TABLET ORAL at 17:27

## 2019-04-27 RX ADMIN — CARBIDOPA AND LEVODOPA SCH EACH: 25; 100 TABLET ORAL at 13:42

## 2019-04-27 RX ADMIN — Medication SCH EACH: at 17:26

## 2019-04-27 RX ADMIN — CARBIDOPA AND LEVODOPA SCH EACH: 25; 100 TABLET ORAL at 10:02

## 2019-04-27 RX ADMIN — DOCUSATE SODIUM,SENNOSIDES SCH TABLET: 50; 8.6 TABLET, FILM COATED ORAL at 10:01

## 2019-04-27 RX ADMIN — IPRATROPIUM BROMIDE AND ALBUTEROL SULFATE SCH AMP: .5; 3 SOLUTION RESPIRATORY (INHALATION) at 08:02

## 2019-04-27 RX ADMIN — CARBIDOPA AND LEVODOPA SCH EACH: 25; 100 TABLET ORAL at 06:00

## 2019-04-27 RX ADMIN — QUETIAPINE FUMARATE SCH MG: 25 TABLET ORAL at 21:31

## 2019-04-27 RX ADMIN — Medication SCH EACH: at 13:42

## 2019-04-27 NOTE — PN
Progress Note (short form)





- Note


Progress Note: 


No change in overall clinical status. 


Nonverbal. 


No acute events overnight. 





 Intake & Output











 04/24/19 04/25/19 04/26/19 04/27/19





 23:59 23:59 23:59 23:59


 


Intake Total 10 30 660 0


 


Balance 10 30 660 0


 


Weight 133 lb 4 oz 121 lb 9 oz 131 lb 6.4 oz 119 lb 2 oz








 Last Vital Signs











Temp Pulse Resp BP Pulse Ox


 


 97.9 F   75   18   109/66   96 


 


 04/27/19 06:12  04/27/19 06:12  04/27/19 06:12  04/27/19 06:12  04/26/19 22:00








Active Medications





Albuterol/Ipratropium (Duoneb -)  1 amp NEB RTID [facility]


   Last Admin: 04/27/19 08:02 Dose:  1 amp


Carbidopa/Levodopa (Sinemet 25/100 -)  1 each PO 0600,1000,1400,1800 [facility]


   Last Admin: 04/27/19 10:02 Dose:  1 each


Mineral Oil (Mineral Oil -)  30 ml PO Q8H PRN


   PRN Reason: FOR DRY MOUTH


   Last Admin: 04/25/19 09:48 Dose:  30 ml


Patient's Own Medication (Non- Formulary) ( Carbidopa/Levodopa [ Rytary Er 

23.75 Mg- 95 Mg Ca  2 each PO QID [facility]


   Last Admin: 04/27/19 10:01 Dose:  2 each


Quetiapine Fumarate (Seroquel -)  25 mg PO HS [facility]


   Last Admin: 04/26/19 22:18 Dose:  25 mg


Senna/Docusate Sodium (Pericolace -)  1 tablet PO DAILY [facility]


   Last Admin: 04/27/19 10:01 Dose:  1 tablet








Gen:  NAD at rest


Heart:  RRR


Lung: scattered rhonchi


Abd: soft, nontender


Ext: no edema








A/P


Pneumonia likely aspiration


Parkinsons 


Dementia


Sacral Decubitus Ulcer





-  ABX completed 


-  inhaled bronchodilators


-  O2 to keep SpO2 >90%


-  aspiration precautions


-  DVT prophylaxis


-  d/c planning





Dr Dean

## 2019-04-27 NOTE — PN
Progress Note (short form)





- Note


Progress Note: 


Events noted


Spoke to  


awaiting bed in [facility] 


she ate pureed diet - no difficulty 





Vital Signs - 24 hr











  04/26/19 04/26/19 04/26/19





  15:27 21:00 22:00


 


Temperature 98.0 F  


 


Pulse Rate 68  


 


Respiratory 20 20 





Rate   


 


Blood Pressure 90/53 L  


 


O2 Sat by Pulse  96 96





Oximetry (%)   














  04/26/19 04/27/19 04/27/19





  23:00 03:00 06:12


 


Temperature 98.5 F 98 F 97.9 F


 


Pulse Rate 72 82 75


 


Respiratory 20 18 18





Rate   


 


Blood Pressure 105/61 111/65 109/66


 


O2 Sat by Pulse   





Oximetry (%)   








 Current Medications











Generic Name Dose Route Start Last Admin





  Trade Name Freq  PRN Reason Stop Dose Admin


 


Albuterol/Ipratropium  1 amp  04/22/19 14:00  04/27/19 08:02





  Duoneb -  NEB   1 amp





  RTID IMELDA   Administration





     





     





     





     


 


Carbidopa/Levodopa  1 each  04/27/19 06:00  04/27/19 10:02





  Sinemet 25/100 -  PO   1 each





  0600,1000,1400,1800 IMELDA   Administration





     





     





     





     


 


Mineral Oil  30 ml  04/23/19 15:41  04/25/19 09:48





  Mineral Oil -  PO   30 ml





  Q8H PRN   Administration





  FOR DRY MOUTH   





     





     





     


 


Patient's Own  2 each  04/23/19 18:00  04/27/19 10:01





Medication (Non-  PO   2 each





Formulary) (  QID IMELDA   Administration





Carbidopa/Levodopa [     





Rytary Er 23.75 Mg-     





95 Mg Ca     





     


 


Quetiapine Fumarate  25 mg  04/26/19 22:00  04/26/19 22:18





  Seroquel -  PO   25 mg





  HS IMELDA   Administration





     





     





     





     


 


Senna/Docusate Sodium  1 tablet  04/19/19 10:00  04/27/19 10:01





  Pericolace -  PO   1 tablet





  DAILY IMELDA   Administration





     





     





     





     











S1 S2 RRR


Lungs - diminished at bases 


abd- soft


ext- no edema


neuro- awake


sacral decubitus





PLAN





stable


continue present care 


abx-- completed


needs 24 hr care-- pt awaiting bed in [facility] 


wound care with wound vac 


 





Problem List





- Problems


(1) Adult failure to thrive


Code(s): R62.7 - ADULT FAILURE TO THRIVE   





(2) CHF exacerbation


Code(s): I50.9 - HEART FAILURE, UNSPECIFIED   


Qualifiers: 


   Heart failure type: unspecified   Qualified Code(s): I50.9 - Heart failure, 

unspecified   





(3) Sacral decubitus ulcer, stage IV


Code(s): L89.154 - PRESSURE ULCER OF SACRAL REGION, STAGE 4

## 2019-04-28 RX ADMIN — CARBIDOPA AND LEVODOPA SCH EACH: 25; 100 TABLET ORAL at 06:42

## 2019-04-28 RX ADMIN — Medication SCH EACH: at 14:03

## 2019-04-28 RX ADMIN — Medication SCH EACH: at 18:01

## 2019-04-28 RX ADMIN — Medication SCH EACH: at 22:01

## 2019-04-28 RX ADMIN — CARBIDOPA AND LEVODOPA SCH EACH: 25; 100 TABLET ORAL at 14:04

## 2019-04-28 RX ADMIN — CARBIDOPA AND LEVODOPA SCH EACH: 25; 100 TABLET ORAL at 18:39

## 2019-04-28 RX ADMIN — CARBIDOPA AND LEVODOPA SCH EACH: 25; 100 TABLET ORAL at 10:43

## 2019-04-28 RX ADMIN — DOCUSATE SODIUM,SENNOSIDES SCH TABLET: 50; 8.6 TABLET, FILM COATED ORAL at 10:42

## 2019-04-28 RX ADMIN — Medication SCH EACH: at 10:42

## 2019-04-28 RX ADMIN — QUETIAPINE FUMARATE SCH MG: 25 TABLET ORAL at 22:01

## 2019-04-28 NOTE — PN
Progress Note (short form)





- Note


Progress Note: 


Events noted


Spoke to  


awaiting bed in [facility] 


she ate pureed diet - no difficulty 


 Vital Signs - 24 hr











  04/27/19 04/27/19 04/27/19





  21:00 21:24 21:39


 


Temperature  98.5 F 


 


Pulse Rate  64 


 


Respiratory 20 20 





Rate   


 


Blood Pressure  110/60 


 


O2 Sat by Pulse 96  96





Oximetry (%)   














  04/28/19 04/28/19 04/28/19





  06:54 10:45 14:15


 


Temperature 97.6 F 98.6 F 97.7 F


 


Pulse Rate 78 56 L 68


 


Respiratory 20 20 18





Rate   


 


Blood Pressure 130/72 120/70 128/47 L


 


O2 Sat by Pulse   





Oximetry (%)   














  04/28/19





  16:54


 


Temperature 98 F


 


Pulse Rate 67


 


Respiratory 20





Rate 


 


Blood Pressure 98/46 L


 


O2 Sat by Pulse 





Oximetry (%) 








 Current Medications











Generic Name Dose Route Start Last Admin





  Trade Name Freq  PRN Reason Stop Dose Admin


 


Carbidopa/Levodopa  1 each  04/27/19 06:00  04/28/19 18:39





  Sinemet 25/100 -  PO   1 each





  0600,1000,1400,1800 IMELDA   Administration





     





     





     





     


 


Mineral Oil  30 ml  04/23/19 15:41  04/25/19 09:48





  Mineral Oil -  PO   30 ml





  Q8H PRN   Administration





  FOR DRY MOUTH   





     





     





     


 


Patient's Own  2 each  04/23/19 18:00  04/28/19 18:01





Medication (Non-  PO   2 each





Formulary) (  QID IMELDA   Administration





Carbidopa/Levodopa [     





Rytary Er 23.75 Mg-     





95 Mg Ca     





     


 


Quetiapine Fumarate  25 mg  04/26/19 22:00  04/27/19 21:31





  Seroquel -  PO   25 mg





  HS IMELDA   Administration





     





     





     





     


 


Senna/Docusate Sodium  1 tablet  04/19/19 10:00  04/28/19 10:42





  Pericolace -  PO   1 tablet





  DAILY IMELDA   Administration





     





     





     





     














S1 S2 RRR


Lungs - diminished at bases 


abd- soft


ext- no edema


neuro- awake


sacral decubitus





PLAN





stable


continue present care 


abx-- completed


needs 24 hr care-- pt awaiting bed in [facility] 


wound care with wound vac 


 





Problem List





- Problems


(1) Adult failure to thrive


Code(s): R62.7 - ADULT FAILURE TO THRIVE   





(2) CHF exacerbation


Code(s): I50.9 - HEART FAILURE, UNSPECIFIED   


Qualifiers: 


   Heart failure type: unspecified   Qualified Code(s): I50.9 - Heart failure, 

unspecified   





(3) Sacral decubitus ulcer, stage IV


Code(s): L89.154 - PRESSURE ULCER OF SACRAL REGION, STAGE 4

## 2019-04-28 NOTE — PN
Progress Note (short form)





- Note


Progress Note: 


No change in overall clinical status. 


Nonverbal. 


No acute events overnight. 





 Intake & Output











 04/25/19 04/26/19 04/27/19 04/28/19





 23:59 23:59 23:59 23:59


 


Intake Total 30 660 400 


 


Balance 30 660 400 


 


Weight 121 lb 9 oz 131 lb 6.4 oz 119 lb 2 oz 117 lb 6 oz





 


 Last Vital Signs











Temp Pulse Resp BP Pulse Ox


 


 97.6 F   78   20   130/72   96 


 


 04/28/19 06:54  04/28/19 06:54  04/28/19 06:54  04/28/19 06:54  04/27/19 21:39








Active Medications





Carbidopa/Levodopa (Sinemet 25/100 -)  1 each PO 0600,1000,1400,1800 [facility]


   Last Admin: 04/28/19 10:43 Dose:  1 each


Mineral Oil (Mineral Oil -)  30 ml PO Q8H PRN


   PRN Reason: FOR DRY MOUTH


   Last Admin: 04/25/19 09:48 Dose:  30 ml


Patient's Own Medication (Non- Formulary) ( Carbidopa/Levodopa [ Rytary Er 

23.75 Mg- 95 Mg Ca  2 each PO QID [facility]


   Last Admin: 04/28/19 10:42 Dose:  2 each


Quetiapine Fumarate (Seroquel -)  25 mg PO HS [facility]


   Last Admin: 04/27/19 21:31 Dose:  25 mg


Senna/Docusate Sodium (Pericolace -)  1 tablet PO DAILY [facility]


   Last Admin: 04/28/19 10:42 Dose:  1 tablet











Gen:  NAD at rest on NC O2 


Heart:  RRR


Lung: scattered rhonchi


Abd: soft, nontender


Ext: no edema








A/P


Pneumonia likely aspiration


Parkinsons 


Dementia


Sacral Decubitus Ulcer





-  ABX completed 


-  inhaled bronchodilators


-  O2 to keep SpO2 >90%


-  aspiration precautions


-  DVT prophylaxis


-  d/c planning





Dr Dean

## 2019-04-29 VITALS — HEART RATE: 69 BPM | TEMPERATURE: 98.7 F | DIASTOLIC BLOOD PRESSURE: 70 MMHG | SYSTOLIC BLOOD PRESSURE: 110 MMHG

## 2019-04-29 RX ADMIN — Medication SCH EACH: at 10:54

## 2019-04-29 RX ADMIN — CARBIDOPA AND LEVODOPA SCH EACH: 25; 100 TABLET ORAL at 11:19

## 2019-04-29 RX ADMIN — DOCUSATE SODIUM,SENNOSIDES SCH TABLET: 50; 8.6 TABLET, FILM COATED ORAL at 10:54

## 2019-04-29 RX ADMIN — CARBIDOPA AND LEVODOPA SCH EACH: 25; 100 TABLET ORAL at 06:09

## 2019-04-29 NOTE — PN
Progress Note (short form)





- Note


Progress Note: 





pt seen/ examined


no new issues


comfortable


 Vital Signs











Temp  97.8 F   04/29/19 06:00


 


Pulse  53 L  04/29/19 06:00


 


Resp  18   04/29/19 06:00


 


BP  127/68   04/29/19 06:00


 


Pulse Ox  96   04/28/19 22:00








 Intake & Output











 04/28/19 04/28/19 04/29/19





 11:59 23:59 11:59


 


Intake Total  500 50


 


Balance  500 50


 


Weight 117 lb 6 oz  115 lb 11.2 oz


 


Intake:   


 


  Oral  500 50


 


Other:   


 


  Voiding Method Incontinent Incontinent 


 


  # Unmeasured Voids   


 


    Void 2 1 


 


  Weight Measurement Method Built in Bedscale  Built in Bedscale








Active Medications





Carbidopa/Levodopa (Sinemet 25/100 -)  1 each PO 0600,1000,1400,1800 [facility]


   Last Admin: 04/29/19 06:09 Dose:  1 each


Mineral Oil (Mineral Oil -)  30 ml PO Q8H PRN


   PRN Reason: FOR DRY MOUTH


   Last Admin: 04/25/19 09:48 Dose:  30 ml


Patient's Own Medication (Non- Formulary) ( Carbidopa/Levodopa [ Rytary Er 

23.75 Mg- 95 Mg Ca  2 each PO QID [facility]


   Last Admin: 04/28/19 22:01 Dose:  2 each


Quetiapine Fumarate (Seroquel -)  25 mg PO HS [facility]


   Last Admin: 04/28/19 22:01 Dose:  25 mg


Senna/Docusate Sodium (Pericolace -)  1 tablet PO DAILY [facility]


   Last Admin: 04/28/19 10:42 Dose:  1 tablet





 CBC, BMP





 04/21/19 05:30 





 04/21/19 05:30 


Physical Exam


comfortable


S1 S2 RRR


Lungs - diminished at bases 


abd- soft


ext- no edema


neuro- awake


sacral decubitus-- vac in place





PLAN





stable


continue present care 


abx-- completed


needs 24 hr care-- pt awaiting bed in [facility] 


wound care with wound vac 


Discussed with -- Bed available today


Anticipate d/c today.


Discussed with nursing staff also


 





 





Problem List





- Problems


(1) Sacral decubitus ulcer, stage IV


Code(s): L89.154 - PRESSURE ULCER OF SACRAL REGION, STAGE 4   





(2) Cough


Code(s): R05 - COUGH   





(3) SOB (shortness of breath)


Code(s): R06.02 - SHORTNESS OF BREATH   





(4) Parkinson's disease


Code(s): G20 - PARKINSON'S DISEASE

## 2021-06-21 NOTE — PN
06/21/21 1436   Signing Clinician's Name / Credentials   Signing clinician's name / credentials Say Santiago MPT   Quick Adds   Rehab Discipline PT   Functional Transfer Training   Treatment Detail  minimal assist of 2   Gait Training   Symptoms Noted During/After Treatment (Gait Training) fatigue;increased pain;shortness of breath   Distance in Feet (Required for LE Total Joints) 30   Treatment Detail short ambulation within patient's room   [place] Level (Gait Training) minimum assist (75% patient effort)   Physical Assistance Level (Gait Training) 2 person assist   Weight Bearing (Gait Training) full weight-bearing   Assistive Device (Gait Training) rolling walker   Gait Analysis Deviations decreased gaudencio;increased time in double stance;decreased velocity of limb motion;decreased step length   Therapeutic Activity   Treatment Detail bed mobilities with maximal assist   PT Discharge Planning    PT Discharge Recommendation (DC Rec) home with assist;home with home care physical therapy;Transitional Care Facility   PT Rationale for DC Rec to promote patient's highest level of functional, safe and independent mobility   PT Brief overview of current status  maximal assist for bed mobilities; minimal assist for transfers and short ambulation using a Fww   Additional Documentation   Rehab Comments increased encouragement required to motivate patient to participate; this is very different from when this PT last saw patient on 6/15 when she ambulated 175 feet.   PT Plan continue PT      Progress Note (short form)





- Note


Progress Note: 


pt seen/ examined.


comfortable





 Vital Signs











Temp  97.8 F   04/20/19 09:00


 


Pulse  52 L  04/20/19 09:00


 


Resp  20   04/20/19 09:00


 


BP  118/57 L  04/20/19 09:00


 


Pulse Ox  97   04/20/19 05:00








 Intake & Output











 04/19/19 04/19/19 04/20/19





 11:59 23:59 11:59


 


Intake Total 250 270 0


 


Balance 250 270 0


 


Weight 136 lb 4.8 oz 136 lb 137 lb 12.8 oz


 


Intake:   


 


  IV 10 20 0


 


    SALINE LOCK 10 20 0


 


  IVPB  0 


 


  Oral 240 250 


 


Other:   


 


  Voiding Method Toilet Diaper Diaper


 


  # Unmeasured Voids   


 


    Void 2  


 


  Height 5 ft 6 in 5 ft 6 in 


 


  Body Mass Index (BMI) 21.9 21.9 


 


  Weight Measurement Method Built in [email or web address]  Patient Lift Scale








Active Medications





Heparin Sodium (Porcine) (Heparin -)  5,000 unit SQ BID [facility]


   Last Admin: 04/20/19 10:36 Dose:  5,000 unit


Lisinopril (Prinivil)  5 mg PO DAILY [facility]


   Last Admin: 04/20/19 10:36 Dose:  5 mg


Non-Formulary Medication (Carbidopa/Levodopa [Rytary Er 36.25 Mg-145 Mg Cap])  

1 each PO QID [facility]


   Last Admin: 04/20/19 10:35 Dose:  1 each


Non-Formulary Medication (Rotigotine [Neupro])  1 each TD DAILY [facility]


Quetiapine Fumarate (Seroquel -)  25 mg PO TID [facility]


   Last Admin: 04/20/19 05:13 Dose:  25 mg


Senna/Docusate Sodium (Pericolace -)  1 tablet PO DAILY [facility]


   Last Admin: 04/20/19 10:36 Dose:  1 tablet





 CBC, BMP





 04/19/19 05:30 





 04/19/19 05:30 





 Microbiology











 04/18/19 16:10 Urine Culture - Preliminary





 Urine - Urine Clean Catch    Lactose Fermenting Neg Bacilli


 


 04/18/19 15:27 Blood Culture - Preliminary





 Blood - Peripheral Venous    NO GROWTH OBTAINED AFTER 24 HOURS, INCUBATION TO 

CONTINUE





    FOR 4 DAYS.


 


 04/18/19 15:27 Blood Culture - Preliminary





 Blood - Peripheral Venous    NO GROWTH OBTAINED AFTER 24 HOURS, INCUBATION TO 

CONTINUE





    FOR 4 DAYS.

















echo-- Noted








Physical Exam


S1 S2 RRR


Lungs - diminished at bases 


abd- soft


ext- no edema


neuro- awake


sacral decubitus





PLAN





stable


continue present care 


scd stockings


bb  held due to bradycardia


swallow eval


abx


will follow











Problem List





- Problems


(1) Sacral decubitus ulcer, stage IV


Code(s): L89.154 - PRESSURE ULCER OF SACRAL REGION, STAGE 4   





(2) Cough


Code(s): R05 - COUGH   





(3) SOB (shortness of breath)


Code(s): R06.02 - SHORTNESS OF BREATH   





(4) Parkinson's disease


Code(s): G20 - PARKINSON'S DISEASE